# Patient Record
Sex: MALE | Race: WHITE | NOT HISPANIC OR LATINO | Employment: STUDENT | URBAN - METROPOLITAN AREA
[De-identification: names, ages, dates, MRNs, and addresses within clinical notes are randomized per-mention and may not be internally consistent; named-entity substitution may affect disease eponyms.]

---
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
## 2022-10-29 ENCOUNTER — HOSPITAL ENCOUNTER (EMERGENCY)
Facility: HOSPITAL | Age: 22
Discharge: HOME | End: 2022-10-29
Attending: SURGERY
Payer: MEDICAID

## 2022-10-29 VITALS
RESPIRATION RATE: 18 BRPM | SYSTOLIC BLOOD PRESSURE: 172 MMHG | OXYGEN SATURATION: 95 % | HEART RATE: 113 BPM | BODY MASS INDEX: 52.42 KG/M2 | TEMPERATURE: 101 F | DIASTOLIC BLOOD PRESSURE: 99 MMHG | WEIGHT: 315 LBS

## 2022-10-29 DIAGNOSIS — J10.1 INFLUENZA A: Primary | ICD-10-CM

## 2022-10-29 LAB
INFLUENZA A, MOLECULAR: POSITIVE
INFLUENZA B, MOLECULAR: NEGATIVE
SARS-COV-2 RDRP RESP QL NAA+PROBE: NEGATIVE
SPECIMEN SOURCE: ABNORMAL

## 2022-10-29 PROCEDURE — 25000003 PHARM REV CODE 250: Performed by: NURSE PRACTITIONER

## 2022-10-29 PROCEDURE — 99283 EMERGENCY DEPT VISIT LOW MDM: CPT | Mod: 25

## 2022-10-29 PROCEDURE — 87502 INFLUENZA DNA AMP PROBE: CPT

## 2022-10-29 PROCEDURE — U0002 COVID-19 LAB TEST NON-CDC: HCPCS | Performed by: NURSE PRACTITIONER

## 2022-10-29 PROCEDURE — 87502 INFLUENZA DNA AMP PROBE: CPT | Performed by: NURSE PRACTITIONER

## 2022-10-29 RX ORDER — OSELTAMIVIR PHOSPHATE 75 MG/1
75 CAPSULE ORAL 2 TIMES DAILY
Qty: 10 CAPSULE | Refills: 0 | Status: SHIPPED | OUTPATIENT
Start: 2022-10-29 | End: 2022-11-03

## 2022-10-29 RX ORDER — ACETAMINOPHEN 500 MG
1000 TABLET ORAL
Status: COMPLETED | OUTPATIENT
Start: 2022-10-29 | End: 2022-10-29

## 2022-10-29 RX ADMIN — ACETAMINOPHEN 1000 MG: 500 TABLET ORAL at 07:10

## 2022-10-29 NOTE — ED PROVIDER NOTES
Encounter Date: 10/29/2022       History     Chief Complaint   Patient presents with    Suture / Staple Removal     Patient to ER CC of needing staples removed from his head    General Illness     Also reports flu like symptoms, cough and fever started yesterday      Greg Wynn is a 22 y.o. male with PMH of ADHD who presents to the ED for staple removal and flu-like symptoms.  Patient presents with staples after he sustained an injury to his head on 10/16/22.  He was evaluated at [facility] ED. He denies pain or drainage from site.  He presents for staple removal.  He also notes nasal congestion, subjective fever, and cough.  Cough is dry nonproductive.  Denies chest pain or shortness of breath.  Denies sick contacts at home.    The history is provided by the patient.   Review of patient's allergies indicates:  No Known Allergies  Past Medical History:   Diagnosis Date    ADHD (attention deficit hyperactivity disorder)      Past Surgical History:   Procedure Laterality Date    CHOLECYSTECTOMY  06/20/2018    LAPAROSCOPIC CHOLECYSTECTOMY N/A 6/20/2018    Procedure: CHOLECYSTECTOMY, LAPAROSCOPIC;  Surgeon: Luis Bogran-Reyes, MD;  Location: [facility];  Service: General;  Laterality: N/A;     Family History   Problem Relation Age of Onset    Diabetes Mother     Hypertension Mother     Other Mother         fluid extractions from heart     Heart disease Mother         double bypass    Hypertension Father     Other Father         heart attack    Heart disease Father     No Known Problems Sister     No Known Problems Brother     Cancer Maternal Aunt     No Known Problems Maternal Uncle     Depression Paternal Aunt     No Known Problems Paternal Uncle     Allergies Maternal Grandmother     Diabetes Maternal Grandfather     Other Paternal Grandmother     Hypertension Paternal Grandfather     ADD / ADHD Neg Hx     Alcohol abuse Neg Hx     Asthma Neg Hx     Autism spectrum disorder Neg Hx     Behavior problems Neg Hx     Birth  defects Neg Hx     Chromosomal disorder Neg Hx     Cleft lip Neg Hx     Congenital heart disease Neg Hx     Early death Neg Hx     Eczema Neg Hx     Hearing loss Neg Hx     Hyperlipidemia Neg Hx     Kidney disease Neg Hx     Learning disabilities Neg Hx     Mental illness Neg Hx     Migraines Neg Hx     Neurodegenerative disease Neg Hx     Obesity Neg Hx     Seizures Neg Hx     SIDS Neg Hx     Thyroid disease Neg Hx      Social History     Tobacco Use    Smoking status: Never    Smokeless tobacco: Never   Substance Use Topics    Alcohol use: No    Drug use: No     Review of Systems   Constitutional:  Positive for fever. Negative for activity change and fatigue.   HENT:  Positive for congestion and rhinorrhea. Negative for sore throat.    Respiratory:  Positive for cough. Negative for chest tightness and shortness of breath.    Cardiovascular:  Negative for chest pain.   Gastrointestinal:  Negative for abdominal pain, diarrhea, nausea and vomiting.   Genitourinary:  Negative for dysuria.   Musculoskeletal:  Negative for back pain.   Skin:  Positive for wound.   Neurological:  Negative for dizziness and weakness.     Physical Exam     Initial Vitals [10/29/22 1856]   BP Pulse Resp Temp SpO2   (!) 172/99 (!) 113 18 (!) 100.5 °F (38.1 °C) 95 %      MAP       --         Physical Exam    Nursing note and vitals reviewed.  Constitutional: He appears well-developed and well-nourished.   HENT:   Head: Normocephalic and atraumatic.   Nose: Mucosal edema and rhinorrhea present.   Eyes: Conjunctivae and EOM are normal. Pupils are equal, round, and reactive to light.   Neck: Neck supple.   Cardiovascular:  Normal rate, regular rhythm, normal heart sounds and intact distal pulses.           Pulmonary/Chest: Breath sounds normal.   Abdominal: Abdomen is soft. Bowel sounds are normal.   Musculoskeletal:         General: Normal range of motion.      Cervical back: Neck supple.     Neurological: He is alert and oriented to person,  place, and time. He has normal strength.   Skin: Skin is warm and dry. Laceration (scalp staples intact. No drainage) noted.   Psychiatric: He has a normal mood and affect. His behavior is normal. Judgment and thought content normal.       ED Course   Suture Removal    Date/Time: 10/29/2022 6:59 PM  Location procedure was performed: [facility] EMERGENCY DEPARTMENT  Performed by: Zayra Lu NP  Authorized by: Lester Nuñez MD   Assisting provider: Lester Nuñez MD  Pre-operative diagnosis: staple removal  Post-operative diagnosis: same  Body area: head/neck  Location details: scalp  Description of findings: scalp laceration   Wound Appearance: clean, well healed, nontender and no drainage  Staples Removed: 5  Post-removal: no dressing applied      Labs Reviewed   INFLUENZA A & B BY MOLECULAR - Abnormal; Notable for the following components:       Result Value    Influenza A, Molecular Positive (*)     All other components within normal limits   SARS-COV-2 RNA AMPLIFICATION, QUAL          Imaging Results    None          Medications   acetaminophen tablet 1,000 mg (1,000 mg Oral Given 10/29/22 1901)     Medical Decision Making:   Differential Diagnosis:   Influenza, COVID-19, viral URI  Clinical Tests:   Lab Tests: Ordered and Reviewed  ED Management:  Evaluation of a 22-year-old male with nasal congestion, subjective fever, and cough.  He also presents for staple removal.  Scalp laceration with staples with no obvious signs of infection.  Staples removed without difficulty.  Tolerated well.  Influenza A positive.  Patient will be discharged home with prescription Tamiflu and symptomatic treatment. Patient/caregiver voices understanding and feels comfortable with discharge plan.      The patient acknowledges that close follow up with medical provider is required. Instructed to follow up with PCP within 2 days. Patient was given specific return precautions. The patient agrees to comply with all instruction and  directions given in the ER.                          Clinical Impression:   Final diagnoses:  [J10.1] Influenza A (Primary)      ED Disposition Condition    Discharge Stable          ED Prescriptions       Medication Sig Dispense Start Date End Date Auth. Provider    oseltamivir (TAMIFLU) 75 MG capsule Take 1 capsule (75 mg total) by mouth 2 (two) times daily. for 5 days 10 capsule 10/29/2022 11/3/2022 Zayra Lu NP          Follow-up Information       Follow up With Specialties Details Why Contact Info    Evans Combs MD Pediatrics Schedule an appointment as soon as possible for a visit in 2 days  [address] [facility] [address]  [phone]               Zayra Lu NP  10/29/22 1953

## 2022-10-30 NOTE — ED NOTES
MD reviewed results and DC instructions with patient, patient verbalized understanding. AVS printed and given. Refer to MD notes for patient assessment

## 2022-11-02 ENCOUNTER — PATIENT OUTREACH (OUTPATIENT)
Dept: EMERGENCY MEDICINE | Facility: HOSPITAL | Age: 22
End: 2022-11-02
Payer: MEDICAID

## 2022-11-08 NOTE — PROGRESS NOTES
ED Navigator attempted to contact patient on 3 or more separate occasions, patient is unable to reach. ED Navigator to close encounter at this time.     Ronda Zamorano  ED Navigator- [place]  [phone]

## 2024-04-18 ENCOUNTER — OFFICE VISIT (OUTPATIENT)
Dept: URGENT CARE | Facility: CLINIC | Age: 24
End: 2024-04-18
Payer: COMMERCIAL

## 2024-04-18 VITALS
SYSTOLIC BLOOD PRESSURE: 160 MMHG | BODY MASS INDEX: 45.1 KG/M2 | TEMPERATURE: 99 F | HEIGHT: 70 IN | WEIGHT: 315 LBS | HEART RATE: 79 BPM | DIASTOLIC BLOOD PRESSURE: 101 MMHG | OXYGEN SATURATION: 97 % | RESPIRATION RATE: 18 BRPM

## 2024-04-18 DIAGNOSIS — K52.9 GASTROENTERITIS: Primary | ICD-10-CM

## 2024-04-18 PROCEDURE — 99214 OFFICE O/P EST MOD 30 MIN: CPT | Mod: S$GLB,,, | Performed by: FAMILY MEDICINE

## 2024-04-18 RX ORDER — PROMETHAZINE HYDROCHLORIDE 25 MG/1
25 TABLET ORAL EVERY 6 HOURS PRN
Qty: 20 TABLET | Refills: 0 | Status: SHIPPED | OUTPATIENT
Start: 2024-04-18

## 2024-04-18 RX ORDER — DIPHENOXYLATE HYDROCHLORIDE AND ATROPINE SULFATE 2.5; .025 MG/1; MG/1
1 TABLET ORAL 4 TIMES DAILY PRN
Qty: 20 TABLET | Refills: 0 | Status: SHIPPED | OUTPATIENT
Start: 2024-04-18

## 2024-04-18 RX ORDER — PROMETHAZINE HYDROCHLORIDE 25 MG/1
25 SUPPOSITORY RECTAL EVERY 6 HOURS PRN
Qty: 5 SUPPOSITORY | Refills: 0 | Status: SHIPPED | OUTPATIENT
Start: 2024-04-18

## 2024-04-18 NOTE — LETTER
April 18, 2024  Greg Wynn  [address]  [place] [address]                [facility] [place]  [address]  [place] [address]  Phone: [phone]  Fax: [phone] Greg Wynn was seen and treated in our Urgent Care department on 4/18/2024. He may return to work in 2 - 3 days.      If you have any questions or concerns, please don't hesitate to call.        Sincerely,        Giuseppe Cruz MD

## 2024-04-18 NOTE — PROGRESS NOTES
"Subjective:      Patient ID: Greg Wynn is a 24 y.o. male.    Vitals:  height is 5' 10" (1.778 m) and weight is 160.6 kg (354 lb) (abnormal). His oral temperature is 98.5 °F (36.9 °C). His blood pressure is 160/101 (abnormal) and his pulse is 79. His respiration is 18 and oxygen saturation is 97%.     Chief Complaint: Emesis    Woke with vomiting and diarrhea this morning.     Emesis   This is a new problem. The current episode started today. The problem occurs 2 to 4 times per day. The problem has been unchanged. The emesis has an appearance of stomach contents. There has been no fever. Associated symptoms include diarrhea and headaches. Pertinent negatives include no abdominal pain, arthralgias, chest pain, chills, coughing, decreased urine volume, dizziness, fever, myalgias, sweats, URI or weight loss. Risk factors: denies. He has tried nothing for the symptoms. The treatment provided no relief.       Constitution: Negative. Negative for chills and fever.   HENT: Negative.     Cardiovascular: Negative.  Negative for chest pain.   Eyes: Negative.    Respiratory: Negative.  Negative for cough.    Gastrointestinal:  Positive for vomiting and diarrhea. Negative for abdominal pain.   Endocrine: negative.   Genitourinary: Negative.  Negative for urine decreased.   Musculoskeletal: Negative.  Negative for joint pain and muscle ache.   Skin: Negative.    Allergic/Immunologic: Negative.    Neurological:  Positive for headaches. Negative for dizziness.   Hematologic/Lymphatic: Negative.    Psychiatric/Behavioral: Negative.        Objective:     Physical Exam   Constitutional: He is oriented to person, place, and time. He appears well-developed.   HENT:   Head: Normocephalic and atraumatic.   Ears:   Right Ear: External ear normal.   Left Ear: External ear normal.   Nose: Nose normal.   Mouth/Throat: Mucous membranes are normal.   Eyes: Conjunctivae and lids are normal.   Neck: Trachea normal. Neck supple. "   Cardiovascular: Normal rate, regular rhythm and normal heart sounds.   Pulmonary/Chest: Effort normal and breath sounds normal. No respiratory distress.   Abdominal: Normal appearance and bowel sounds are normal. He exhibits no distension and no mass. Soft. There is no abdominal tenderness.   Musculoskeletal: Normal range of motion.         General: Normal range of motion.   Neurological: He is alert and oriented to person, place, and time. He has normal strength.   Skin: Skin is warm, dry, intact, not diaphoretic and not pale.   Psychiatric: His speech is normal and behavior is normal. Judgment and thought content normal.   Nursing note and vitals reviewed.      Assessment:     1. Gastroenteritis        Plan:       Gastroenteritis  -     promethazine (PHENERGAN) 25 MG suppository; Place 1 suppository (25 mg total) rectally every 6 (six) hours as needed for Nausea.  Dispense: 5 suppository; Refill: 0  -     promethazine (PHENERGAN) 25 MG tablet; Take 1 tablet (25 mg total) by mouth every 6 (six) hours as needed for Nausea.  Dispense: 20 tablet; Refill: 0  -     diphenoxylate-atropine 2.5-0.025 mg (LOMOTIL) 2.5-0.025 mg per tablet; Take 1 tablet by mouth 4 (four) times daily as needed for Diarrhea.  Dispense: 20 tablet; Refill: 0      Please drink plenty of fluids.  Please get plenty of rest.  Clear liquid diet as instructed for 2 - 3 days or until symptoms improve.  Please return here or go to the Emergency Department for any concerns or worsening of condition.  If you were prescribed antibiotics, please take them to completion.  If not allergic, please take over the counter Tylenol (Acetaminophen) as directed for control of pain and/or fever.  Motrin (advil) or Alleve may help a fever, but they may also worsen your Nausea and Vomiting.    Please follow up with your primary care doctor or specialist as needed.    Evans Combs MD ([id number])  [phone]    If you  smoke, please stop smoking.    You must  understand that you have received treatment at an Urgent Care facility only, and that you may be  released before all of your medical problems are known or treated. Urgent Care facilities are not equipped to  handle life threatening emergencies. It is recommended that you seek care at an Emergency Department for  further evaluation of worsening or concerning symptoms, or possibly life threatening conditions as  discussed.

## 2024-04-18 NOTE — PATIENT INSTRUCTIONS
Please drink plenty of fluids.  Please get plenty of rest.  Clear liquid diet as instructed for 2 - 3 days or until symptoms improve.  Please return here or go to the Emergency Department for any concerns or worsening of condition.  If you were prescribed antibiotics, please take them to completion.  If not allergic, please take over the counter Tylenol (Acetaminophen) as directed for control of pain and/or fever.  Motrin (advil) or Alleve may help a fever, but they may also worsen your Nausea and Vomiting.    Please follow up with your primary care doctor or specialist as needed.    Evans Combs MD (inactive) [phone]    If you  smoke, please stop smoking.    You must understand that you have received treatment at an Urgent Care facility only, and that you may be  released before all of your medical problems are known or treated. Urgent Care facilities are not equipped to  handle life threatening emergencies. It is recommended that you seek care at an Emergency Department for  further evaluation of worsening or concerning symptoms, or possibly life threatening conditions as  discussed.    Clear Liquid Diet    Clear liquids are any liquid that you can see through. They are also very easy to digest. You may be put on a clear liquid diet if you are recovering from irritation or infection of the stomach or intestinal tract. This diet may also be used before surgery or special procedures such as a colonoscopy. You should not be on this diet for more than 3 days. Below are some clear liquids you can have on this diet.  Adults and children over 2 years old  Adults should drink a total of 2 to 3 quarts of liquid per day. It may be easier to drink small frequent servings rather than a few large ones. Liquids can include:  Fruit juices. Strained orange juice or lemonade (no pulp); apple, grape, and cranberry juice; clear fruit drinks  Beverages. Sport drinks, sodas, mineral water (plain or flavored), tea, black  coffee, liquid gelatin (add twice the recommended amount of water)  Soups. Clear broth  Desserts. Plain gelatin, popsicles, fruit juice bars  Children under 2 years old  Oral rehydration fluids are available at drug stores and most grocery stores. You dont need a prescription.  Date Last Reviewed: 8/1/2016 © 2000-2016 [employer] [address] [place] [address]. All rights reserved. This information is not intended as a substitute for professional medical care. Always follow your healthcare professional's instructions.      Viral Gastroenteritis (Adult)    Gastroenteritis is commonly called the stomach flu. It is most often caused by a virus that affects the stomach and intestinal tract and usually lasts from 2 to 7 days. Common viruses causing gastroenteritis include norovirus, rotavirus, and hepatitis A. Non-viral causes of gastroenteritis include bacteria, parasites, and toxins.  The danger from repeated vomiting or diarrhea is dehydration. This is the loss of too much fluid from the body. When this occurs, body fluids must be replaced. Antibiotics do not help with this illness because it is usually viral.Simple home treatment will be helpful.  Symptoms of viral gastroenteritis may include:  Watery, loose stools  Stomach pain or abdominal cramps  Fever and chills  Nausea and vomiting  Loss of bowel control  Headache  Home care  Gastroenteritis is transmitted by contact with the stool or vomit of an infected person. This can occur from person to person or from contact with a contaminated surface.  Follow these guidelines when caring for yourself at home:  If symptoms are severe, rest at home for the next 24 hours or until you are feeling better.  Wash your hands with soap and water or use alcohol-based  to prevent the spread of infection. Wash your hands after touching anyone who is sick.  Wash your hands or use alcohol-based  after using the toilet and before meals.  Clean the toilet after each use.  Remember these tips when preparing food:  People with diarrhea should not prepare or serve food to others. When preparing foods, wash your hands before and after.  Wash your hands after using cutting boards, countertops, knives, or utensils that have been in contact with raw food.  Keep uncooked meats away from cooked and ready-to-eat foods.  Medicine  You may use acetaminophen or NSAID medicines like ibuprofen or naproxen to control fever unless another medicine was given. If you have chronic liver or kidney disease, talk with your healthcare provider before using these medicines. Also talk with your provider if you've had a stomach ulcer or gastrointestinal bleeding. Don't give aspirin to anyone under 18 years of age who is ill with a fever. It may cause severe liver damage. Don't use NSAIDS is you are already taking one for another condition (like arthritis) or are on aspirin (such as for heart disease or after a stroke).  If medicine for vomiting or diarrhea are prescribed, take these only as directed. Do not take over-the-counter medicines for vomiting or diarrhea unless instructed by your healthcare provider.  Diet  Follow these guidelines for food:  Water and liquids are important so you don't get dehydrated. Drink a small amount at a time or suck on ice chips if you are vomiting.  If you eat, avoid fatty, greasy, spicy, or fried foods.  Don't eat dairy if you have diarrhea. This can make diarrhea worse.  Avoid tobacco, alcohol, and caffeine which may worsen symptoms.  During the first 24 hours (the first full day), follow the diet below:  Beverages. Sports drinks, soft drinks without caffeine, ginger ale, mineral water (plain or flavored), decaffeinated tea and coffee. If you are very dehydrated, sports drinks aren't a good choice. They have too much sugar and not enough electrolytes. In this case, commercially available products called oral rehydration solutions, are  best.  Soups. Eat clear broth, consommé, and bouillon.  Desserts. Eat gelatin, popsicles, and fruit juice bars.  During the next 24 hours (the second day), you may add the following to the above:  Hot cereal, plain toast, bread, rolls, and crackers  Plain noodles, rice, mashed potatoes, chicken noodle or rice soup  Unsweetened canned fruit (avoid pineapple), bananas  Limit fat intake to less than 15 grams per day. Do this by avoiding margarine, butter, oils, mayonnaise, sauces, gravies, fried foods, peanut butter, meat, poultry, and fish.  Limit fiber and avoid raw or cooked vegetables, fresh fruits (except bananas), and bran cereals.  Limit caffeine and chocolate. Don't use spices or seasonings other than salt.  Limit dairy products.  Avoid alcohol.  During the next 24 hours:  Gradually resume a normal diet as you feel better and your symptoms improve.  If at any time it starts getting worse again, go back to clear liquids until you feel better.  Follow-up care  Follow up with your healthcare provider, or as advised. Call your provider if you don't get better within 24 hours or if diarrhea lasts more than a week. Also follow up if you are unable to keep down liquids and get dehydrated. If a stool (diarrhea) sample was taken, call as directed for the results.  Call 911  Call 911 if any of these occur:  Trouble breathing  Chest pain  Confused  Severe drowsiness or trouble awakening  Fainting or loss of consciousness  Rapid heart rate  Seizure  Stiff neck  When to seek medical advice  Call your healthcare provider right away if any of these occur:  Abdominal pain that gets worse  Continued vomiting (unable to keep liquids down)  Frequent diarrhea (more than 5 times a day)  Blood in vomit or stool (black or red color)  Dark urine, reduced urine output, or extreme thirst  Weakness or dizziness  Drowsiness  Fever of 100.4°F (38°C) oral or higher that does not get better with fever medicine  New rash  Date Last Reviewed:  1/3/2016  © 0295-8310 [employer] [address] [place] [address], [place] [address]. All rights reserved. This information is not intended as a substitute for professional medical care. Always follow your healthcare professional's instructions.

## 2024-07-01 ENCOUNTER — HOSPITAL ENCOUNTER (OUTPATIENT)
Facility: HOSPITAL | Age: 24
Discharge: HOME | End: 2024-07-03
Attending: EMERGENCY MEDICINE | Admitting: INTERNAL MEDICINE
Payer: COMMERCIAL

## 2024-07-01 DIAGNOSIS — E11.22 CKD STAGE 2 DUE TO TYPE 2 DIABETES MELLITUS: ICD-10-CM

## 2024-07-01 DIAGNOSIS — N18.2 CKD STAGE 2 DUE TO TYPE 2 DIABETES MELLITUS: ICD-10-CM

## 2024-07-01 DIAGNOSIS — K57.32 DIVERTICULITIS OF SIGMOID COLON: ICD-10-CM

## 2024-07-01 DIAGNOSIS — R73.9 HYPERGLYCEMIA: Primary | ICD-10-CM

## 2024-07-01 DIAGNOSIS — K57.92 DIVERTICULITIS: ICD-10-CM

## 2024-07-01 LAB
ALBUMIN SERPL BCP-MCNC: 3.7 G/DL (ref 3.5–5.2)
ALP SERPL-CCNC: 101 U/L (ref 55–135)
ALT SERPL W/O P-5'-P-CCNC: 57 U/L (ref 10–44)
ANION GAP SERPL CALC-SCNC: 8 MMOL/L (ref 8–16)
AST SERPL-CCNC: 28 U/L (ref 10–40)
BACTERIA #/AREA URNS HPF: NORMAL /HPF
BASOPHILS # BLD AUTO: 0.06 K/UL (ref 0–0.2)
BASOPHILS NFR BLD: 0.4 % (ref 0–1.9)
BILIRUB SERPL-MCNC: 0.8 MG/DL (ref 0.1–1)
BILIRUB UR QL STRIP: NEGATIVE
BUN SERPL-MCNC: 12 MG/DL (ref 6–20)
CALCIUM SERPL-MCNC: 9.4 MG/DL (ref 8.7–10.5)
CHLORIDE SERPL-SCNC: 103 MMOL/L (ref 95–110)
CLARITY UR: CLEAR
CO2 SERPL-SCNC: 27 MMOL/L (ref 23–29)
COLOR UR: YELLOW
CREAT SERPL-MCNC: 0.8 MG/DL (ref 0.5–1.4)
DIFFERENTIAL METHOD BLD: ABNORMAL
EOSINOPHIL # BLD AUTO: 0.2 K/UL (ref 0–0.5)
EOSINOPHIL NFR BLD: 1.2 % (ref 0–8)
ERYTHROCYTE [DISTWIDTH] IN BLOOD BY AUTOMATED COUNT: 12.9 % (ref 11.5–14.5)
EST. GFR  (NO RACE VARIABLE): >60 ML/MIN/1.73 M^2
GLUCOSE SERPL-MCNC: 202 MG/DL (ref 70–110)
GLUCOSE UR QL STRIP: ABNORMAL
HCT VFR BLD AUTO: 41.6 % (ref 40–54)
HGB BLD-MCNC: 13.8 G/DL (ref 14–18)
HGB UR QL STRIP: NEGATIVE
HYALINE CASTS #/AREA URNS LPF: 1 /LPF
IMM GRANULOCYTES # BLD AUTO: 0.05 K/UL (ref 0–0.04)
IMM GRANULOCYTES NFR BLD AUTO: 0.4 % (ref 0–0.5)
KETONES UR QL STRIP: ABNORMAL
LEUKOCYTE ESTERASE UR QL STRIP: NEGATIVE
LIPASE SERPL-CCNC: 9 U/L (ref 4–60)
LYMPHOCYTES # BLD AUTO: 1.5 K/UL (ref 1–4.8)
LYMPHOCYTES NFR BLD: 11.3 % (ref 18–48)
MCH RBC QN AUTO: 27.7 PG (ref 27–31)
MCHC RBC AUTO-ENTMCNC: 33.2 G/DL (ref 32–36)
MCV RBC AUTO: 84 FL (ref 82–98)
MICROSCOPIC COMMENT: NORMAL
MONOCYTES # BLD AUTO: 0.9 K/UL (ref 0.3–1)
MONOCYTES NFR BLD: 6.6 % (ref 4–15)
NEUTROPHILS # BLD AUTO: 10.9 K/UL (ref 1.8–7.7)
NEUTROPHILS NFR BLD: 80.1 % (ref 38–73)
NITRITE UR QL STRIP: NEGATIVE
NRBC BLD-RTO: 0 /100 WBC
PH UR STRIP: 7 [PH] (ref 5–8)
PLATELET # BLD AUTO: 311 K/UL (ref 150–450)
PMV BLD AUTO: 10.2 FL (ref 9.2–12.9)
POTASSIUM SERPL-SCNC: 3.9 MMOL/L (ref 3.5–5.1)
PROT SERPL-MCNC: 7.7 G/DL (ref 6–8.4)
PROT UR QL STRIP: ABNORMAL
RBC # BLD AUTO: 4.98 M/UL (ref 4.6–6.2)
RBC #/AREA URNS HPF: 1 /HPF (ref 0–4)
SODIUM SERPL-SCNC: 138 MMOL/L (ref 136–145)
SP GR UR STRIP: 1.01 (ref 1–1.03)
URN SPEC COLLECT METH UR: ABNORMAL
UROBILINOGEN UR STRIP-ACNC: 1 EU/DL
WBC # BLD AUTO: 13.56 K/UL (ref 3.9–12.7)
WBC #/AREA URNS HPF: 1 /HPF (ref 0–5)

## 2024-07-01 PROCEDURE — 85025 COMPLETE CBC W/AUTO DIFF WBC: CPT | Performed by: PHYSICIAN ASSISTANT

## 2024-07-01 PROCEDURE — 25500020 PHARM REV CODE 255: Performed by: EMERGENCY MEDICINE

## 2024-07-01 PROCEDURE — 63600175 PHARM REV CODE 636 W HCPCS: Performed by: EMERGENCY MEDICINE

## 2024-07-01 PROCEDURE — 83690 ASSAY OF LIPASE: CPT | Performed by: PHYSICIAN ASSISTANT

## 2024-07-01 PROCEDURE — 25000003 PHARM REV CODE 250: Performed by: EMERGENCY MEDICINE

## 2024-07-01 PROCEDURE — G0378 HOSPITAL OBSERVATION PER HR: HCPCS

## 2024-07-01 PROCEDURE — 81000 URINALYSIS NONAUTO W/SCOPE: CPT | Performed by: PHYSICIAN ASSISTANT

## 2024-07-01 PROCEDURE — 80053 COMPREHEN METABOLIC PANEL: CPT | Performed by: PHYSICIAN ASSISTANT

## 2024-07-01 PROCEDURE — 63600175 PHARM REV CODE 636 W HCPCS: Performed by: INTERNAL MEDICINE

## 2024-07-01 PROCEDURE — 94760 N-INVAS EAR/PLS OXIMETRY 1: CPT

## 2024-07-01 RX ORDER — SODIUM CHLORIDE, SODIUM LACTATE, POTASSIUM CHLORIDE, CALCIUM CHLORIDE 600; 310; 30; 20 MG/100ML; MG/100ML; MG/100ML; MG/100ML
1000 INJECTION, SOLUTION INTRAVENOUS CONTINUOUS
Status: DISCONTINUED | OUTPATIENT
Start: 2024-07-01 | End: 2024-07-02

## 2024-07-01 RX ORDER — ONDANSETRON HYDROCHLORIDE 2 MG/ML
8 INJECTION, SOLUTION INTRAVENOUS
Status: COMPLETED | OUTPATIENT
Start: 2024-07-01 | End: 2024-07-01

## 2024-07-01 RX ORDER — HYDROMORPHONE HYDROCHLORIDE 1 MG/ML
0.5 INJECTION, SOLUTION INTRAMUSCULAR; INTRAVENOUS; SUBCUTANEOUS EVERY 6 HOURS PRN
Status: DISCONTINUED | OUTPATIENT
Start: 2024-07-01 | End: 2024-07-03

## 2024-07-01 RX ORDER — TALC
6 POWDER (GRAM) TOPICAL NIGHTLY PRN
Status: DISCONTINUED | OUTPATIENT
Start: 2024-07-01 | End: 2024-07-03 | Stop reason: HOSPADM

## 2024-07-01 RX ORDER — SODIUM CHLORIDE 0.9 % (FLUSH) 0.9 %
10 SYRINGE (ML) INJECTION
Status: DISCONTINUED | OUTPATIENT
Start: 2024-07-01 | End: 2024-07-03 | Stop reason: HOSPADM

## 2024-07-01 RX ORDER — AMOXICILLIN AND CLAVULANATE POTASSIUM 875; 125 MG/1; MG/1
1 TABLET, FILM COATED ORAL
Status: DISCONTINUED | OUTPATIENT
Start: 2024-07-01 | End: 2024-07-01

## 2024-07-01 RX ORDER — MORPHINE SULFATE 2 MG/ML
4 INJECTION, SOLUTION INTRAMUSCULAR; INTRAVENOUS
Status: COMPLETED | OUTPATIENT
Start: 2024-07-01 | End: 2024-07-01

## 2024-07-01 RX ADMIN — PIPERACILLIN AND TAZOBACTAM 4.5 G: 4; .5 INJECTION, POWDER, LYOPHILIZED, FOR SOLUTION INTRAVENOUS; PARENTERAL at 04:07

## 2024-07-01 RX ADMIN — SODIUM CHLORIDE, POTASSIUM CHLORIDE, SODIUM LACTATE AND CALCIUM CHLORIDE 1000 ML: 600; 310; 30; 20 INJECTION, SOLUTION INTRAVENOUS at 05:07

## 2024-07-01 RX ADMIN — MORPHINE SULFATE 4 MG: 2 INJECTION, SOLUTION INTRAMUSCULAR; INTRAVENOUS at 02:07

## 2024-07-01 RX ADMIN — IOHEXOL 100 ML: 350 INJECTION, SOLUTION INTRAVENOUS at 03:07

## 2024-07-01 RX ADMIN — SODIUM CHLORIDE, POTASSIUM CHLORIDE, SODIUM LACTATE AND CALCIUM CHLORIDE 1000 ML: 600; 310; 30; 20 INJECTION, SOLUTION INTRAVENOUS at 04:07

## 2024-07-01 RX ADMIN — ONDANSETRON 8 MG: 2 INJECTION INTRAMUSCULAR; INTRAVENOUS at 02:07

## 2024-07-01 RX ADMIN — HYDROMORPHONE HYDROCHLORIDE 0.5 MG: 1 INJECTION, SOLUTION INTRAMUSCULAR; INTRAVENOUS; SUBCUTANEOUS at 06:07

## 2024-07-01 NOTE — PHARMACY MED REC
"    [facility]           Pharmacy  Admission Medication History     The home medication history was taken by Corrina Kerns.      Medication history obtained from Medications listed below were obtained from: Patient/family.Patient states he is not taking any medications    Based on information gathered for medication list, you may go to "Admission" then "Reconcile Home Medications" tabs to review and/or act upon those items.     The home medication list has been updated by the Pharmacy department.   Please read ALL comments highlighted in yellow.   Please address this information as you see fit.    Feel free to contact us if you have any questions or require assistance.    The medications listed below were removed from the home medication list.  Please reorder if appropriate:    Patient reports NOT TAKING the following medication(s):  Cetirizine 10mg  Flonase       No current facility-administered medications on file prior to encounter.     Current Outpatient Medications on File Prior to Encounter   Medication Sig Dispense Refill       Please address this information as you see fit.  Feel free to contact us if you have any questions or require assistance.    Corrina Kerns  [phone]              .          "

## 2024-07-01 NOTE — LETTER
July 3, 2024    Gerg Wynn                   [address] [address]  Phone: [phone]  Fax: [phone]   July 3, 2024     Patient: Greg Wynn       Date of Visit: 7/1/2024       To Whom it May Concern:    Greg Wynn was seen in the hospital on 7/1/2024. He may return to work on 7/4/2024 .    Please excuse him from work missed.    If you have any questions or concerns, please don't hesitate to call.    Sincerely,         FABIOLA Gomez

## 2024-07-01 NOTE — ED PROVIDER NOTES
Encounter Date: 7/1/2024       History     Chief Complaint   Patient presents with    Abdominal Pain     HPI    Patient is a 24y Wm hx ADHD, fatty liver presenting with left lower quadrant pain for the past 3 days.  Pain is rated 9/10, sharp, dull, aching and stabbing. Pain has been worsening and walking and moving makes it worse.  No nausea. Vomited yellow bile this morning.   Endorses associated left testicle soreness  Review of patient's allergies indicates:  No Known Allergies  Past Medical History:   Diagnosis Date    ADHD (attention deficit hyperactivity disorder)      Past Surgical History:   Procedure Laterality Date    CHOLECYSTECTOMY  06/20/2018    LAPAROSCOPIC CHOLECYSTECTOMY N/A 6/20/2018    Procedure: CHOLECYSTECTOMY, LAPAROSCOPIC;  Surgeon: Luis Bogran-Reyes, MD;  Location: [facility];  Service: General;  Laterality: N/A;     Family History   Problem Relation Name Age of Onset    Diabetes Mother      Hypertension Mother      Other Mother          fluid extractions from heart     Heart disease Mother          double bypass    Hypertension Father      Other Father          heart attack    Heart disease Father      No Known Problems Sister      No Known Problems Brother      Cancer Maternal Aunt      No Known Problems Maternal Uncle      Depression Paternal Aunt      No Known Problems Paternal Uncle      Allergies Maternal Grandmother      Diabetes Maternal Grandfather      Other Paternal Grandmother      Hypertension Paternal Grandfather      ADD / ADHD Neg Hx      Alcohol abuse Neg Hx      Asthma Neg Hx      Autism spectrum disorder Neg Hx      Behavior problems Neg Hx      Birth defects Neg Hx      Chromosomal disorder Neg Hx      Cleft lip Neg Hx      Congenital heart disease Neg Hx      Early death Neg Hx      Eczema Neg Hx      Hearing loss Neg Hx      Hyperlipidemia Neg Hx      Kidney disease Neg Hx      Learning disabilities Neg Hx      Mental illness Neg Hx      Migraines Neg Hx       Neurodegenerative disease Neg Hx      Obesity Neg Hx      Seizures Neg Hx      SIDS Neg Hx      Thyroid disease Neg Hx       Social History     Tobacco Use    Smoking status: Never     Passive exposure: Past    Smokeless tobacco: Never   Substance Use Topics    Alcohol use: No    Drug use: No     Review of Systems   Constitutional:  Negative for chills and fever.   Cardiovascular:  Negative for chest pain.   Gastrointestinal:  Positive for abdominal pain and vomiting.   Musculoskeletal:  Negative for back pain.   All other systems reviewed and are negative.       Social Determinants of Health     Tobacco Use: Low Risk  (7/1/2024)    Patient History     Smoking Tobacco Use: Never     Smokeless Tobacco Use: Never     Passive Exposure: Past   Alcohol Use: Not on file   Financial Resource Strain: Not on file   Food Insecurity: Not on file   Transportation Needs: Not on file   Physical Activity: Not on file   Stress: Not on file   Housing Stability: Not on file   Depression: Not on file   Utilities: Not on file   Health Literacy: Not on file   Social Isolation: Not on file       Physical Exam     Initial Vitals [07/01/24 1358]   BP Pulse Resp Temp SpO2   (!) 184/98 98 18 98.6 °F (37 °C) 98 %      MAP       --         Physical Exam    Nursing note and vitals reviewed.  Constitutional: He appears well-developed and well-nourished.   HENT:   Head: Normocephalic.   Eyes: EOM are normal. Pupils are equal, round, and reactive to light.   Neck:   Normal range of motion.  Cardiovascular:  Normal rate and intact distal pulses.           Pulmonary/Chest: Breath sounds normal.   Abdominal: Abdomen is soft. There is abdominal tenderness in the left upper quadrant and left lower quadrant.   Genitourinary:    Testes normal.   Cremasteric reflex is present.    Genitourinary Comments: Chaperone present     Musculoskeletal:         General: Normal range of motion.      Cervical back: Normal range of motion.     Neurological: He is alert  and oriented to person, place, and time.   Skin: Capillary refill takes less than 2 seconds.         ED Course   Procedures  Labs Reviewed   CBC W/ AUTO DIFFERENTIAL - Abnormal; Notable for the following components:       Result Value    WBC 13.56 (*)     Hemoglobin 13.8 (*)     Gran # (ANC) 10.9 (*)     Immature Grans (Abs) 0.05 (*)     Gran % 80.1 (*)     Lymph % 11.3 (*)     All other components within normal limits   COMPREHENSIVE METABOLIC PANEL - Abnormal; Notable for the following components:    Glucose 202 (*)     ALT 57 (*)     All other components within normal limits   URINALYSIS, REFLEX TO URINE CULTURE - Abnormal; Notable for the following components:    Protein, UA 1+ (*)     Glucose, UA 2+ (*)     Ketones, UA 1+ (*)     All other components within normal limits    Narrative:     Specimen Source->Urine   LIPASE   URINALYSIS MICROSCOPIC    Narrative:     Specimen Source->Urine          Imaging Results               CT Abdomen Pelvis With IV Contrast NO Oral Contrast (Final result)  Result time 07/01/24 15:50:41      Final result by Evans Hooper MD (07/01/24 15:50:41)                   Impression:      1. Hepatic steatosis  2. Mild splenomegaly.  3. Diverticulosis with findings suspicious for acute diverticulitis of the proximal sigmoid colon with a punctate focus of adjacent extraluminal free intraperitoneal air.  Differential diagnosis includes a segment of inflammatory colitis.  Correlate clinically with possible fever and/or elevated white count.  4. No CT evidence to suggest acute appendicitis.  This report was flagged in Epic as abnormal.      Electronically signed by: Evans Hooper  Date:    07/01/2024  Time:    15:50               Narrative:    EXAMINATION:  CT ABDOMEN PELVIS WITH IV CONTRAST    CLINICAL HISTORY:  Abdominal pain, acute, nonlocalized;r/o hernia;    TECHNIQUE:  Low dose axial images, sagittal and coronal reformations were obtained from the lung bases to the pubic symphysis  following the IV administration of 100 mL of Omnipaque 350 .  Oral contrast was not given.    COMPARISON:  None.    FINDINGS:  Liver is normal in size demonstrating homogeneous decreased attenuation consistent with fatty infiltration.  Previous cholecystectomy.  The spleen is mildly enlarged.  The pancreas and adrenal glands are unremarkable.    Kidneys are normal in size and enhance homogeneously.  No renal calculi.  No changes of hydronephrosis.  No perinephric inflammatory change.    Air and stool throughout the colon and rectum.  Scattered diverticula within the distal colon.  Within the proximal sigmoid colon in the left lower quadrant there is a 7 cm segment of asymmetric circumferential bowel wall thickening with extensive surrounding mesenteric inflammatory change suspicious for acute diverticulitis.  There is an adjacent punctate focus of free intraperitoneal air likely secondary to a ruptured diverticulum.  No localized fluid collection.  No resulting bowel obstruction.  The appendix is normal in caliber.    Bladder is partially distended.  Prostate, seminal vesicles and rectum unremarkable.    No significant mesenteric or retroperitoneal lymphadenopathy.                                       Medications   sodium chloride 0.9% flush 10 mL (has no administration in time range)   melatonin tablet 6 mg (has no administration in time range)   lactated ringers infusion (1,000 mLs Intravenous New Bag 7/1/24 1755)   piperacillin-tazobactam (ZOSYN) 4.5 g in D5W 100 mL IVPB (MB+) (0 g Intravenous Stopped 7/2/24 0034)   HYDROmorphone injection 0.5 mg (0.5 mg Intravenous Given 7/2/24 0412)   morphine injection 4 mg (4 mg Intravenous Given 7/1/24 1428)   ondansetron injection 8 mg (8 mg Intravenous Given 7/1/24 1427)   iohexoL (OMNIPAQUE 350) injection 100 mL (100 mLs Intravenous Given 7/1/24 1543)   piperacillin-tazobactam (ZOSYN) 4.5 g in D5W 100 mL IVPB (MB+) (0 g Intravenous Stopped 7/1/24 1659)   lactated ringers  bolus 1,000 mL (0 mLs Intravenous Stopped 7/1/24 9900)     Medical Decision Making    This is an emergent evaluation of a 24y WM hx fatty liver, hyperglycemia presenting with LLQ abdominal pain.  Exam he is non toxic, afebrile with tenderness in the LLQ and normal  exam.  Labs remarkable for elevated AST, hyperglycemia and leukocytosis.  CT shows diverticulitis with perforation, no abscess.  Will admit for zosyn and surgery evaluation.  He has been made NPO.      DDX: diverticulitis, diverticulosis, UTI, pyelonephritis, nephrolithiasis    Amount and/or Complexity of Data Reviewed  Labs: ordered. Decision-making details documented in ED Course.  Radiology: ordered.    Risk  OTC drugs.  Prescription drug management.               ED Course as of 07/02/24 0609 Mon Jul 01, 2024   1602 WBC(!): 13.56 [AP]   1602 Glucose(!): 202 [AP]      ED Course User Index  [AP] Lindsay Deng MD                           Clinical Impression:  Final diagnoses:  [K57.92] Diverticulitis  [R73.9] Hyperglycemia (Primary)          ED Disposition Condition    Observation Stable                Lindsay Deng MD  07/02/24 0609

## 2024-07-01 NOTE — ED TRIAGE NOTES
C/o lower abdominal pain radiating into testicles since Saturday and getting worse. Vomited this morning.

## 2024-07-01 NOTE — PLAN OF CARE
Problem: Adult Inpatient Plan of Care  Goal: Plan of Care Review  Outcome: Progressing  Goal: Patient-Specific Goal (Individualized)  Outcome: Progressing  Goal: Absence of Hospital-Acquired Illness or Injury  Outcome: Progressing  Goal: Optimal Comfort and Wellbeing  Outcome: Progressing  Goal: Readiness for Transition of Care  Outcome: Progressing     Problem: Bariatric Environmental Safety  Goal: Safety Maintained with Care  Outcome: Progressing     Problem: Pain Acute  Goal: Optimal Pain Control and Function  Outcome: Progressing     Problem: Infection  Goal: Absence of Infection Signs and Symptoms  Outcome: Progressing     Problem: Fall Injury Risk  Goal: Absence of Fall and Fall-Related Injury  Outcome: Progressing

## 2024-07-01 NOTE — LETTER
July 3, 2024          No Recipients                [address] [facility] [address]  Phone: [phone]  Fax: [phone]   Patient: Greg Wynn   MR Number: [id number]   YOB: 2000   Date of Visit: 7/1/2024       Dear Dr. Warren Recipients:    Thank you for referring Greg Wynn to me for evaluation. Below are the relevant portions of my assessment and plan of care.            If you have questions, please do not hesitate to call me. I look forward to following Greg along with you.    Sincerely,      Patricia De La Garza RN           CC    No Recipients

## 2024-07-02 PROBLEM — D72.829 LEUKOCYTOSIS: Status: ACTIVE | Noted: 2024-07-02

## 2024-07-02 PROBLEM — K57.32 DIVERTICULITIS OF SIGMOID COLON: Status: ACTIVE | Noted: 2024-07-02

## 2024-07-02 PROBLEM — E66.01 SEVERE OBESITY (BMI >= 40): Status: ACTIVE | Noted: 2024-07-02

## 2024-07-02 LAB
ALBUMIN SERPL BCP-MCNC: 3.2 G/DL (ref 3.5–5.2)
ALP SERPL-CCNC: 90 U/L (ref 55–135)
ALT SERPL W/O P-5'-P-CCNC: 68 U/L (ref 10–44)
ANION GAP SERPL CALC-SCNC: 8 MMOL/L (ref 8–16)
AST SERPL-CCNC: 48 U/L (ref 10–40)
BASOPHILS # BLD AUTO: 0.06 K/UL (ref 0–0.2)
BASOPHILS NFR BLD: 0.4 % (ref 0–1.9)
BILIRUB SERPL-MCNC: 1.5 MG/DL (ref 0.1–1)
BUN SERPL-MCNC: 9 MG/DL (ref 6–20)
CALCIUM SERPL-MCNC: 9.1 MG/DL (ref 8.7–10.5)
CHLORIDE SERPL-SCNC: 103 MMOL/L (ref 95–110)
CO2 SERPL-SCNC: 27 MMOL/L (ref 23–29)
CREAT SERPL-MCNC: 1.1 MG/DL (ref 0.5–1.4)
DIFFERENTIAL METHOD BLD: ABNORMAL
EOSINOPHIL # BLD AUTO: 0.1 K/UL (ref 0–0.5)
EOSINOPHIL NFR BLD: 0.6 % (ref 0–8)
ERYTHROCYTE [DISTWIDTH] IN BLOOD BY AUTOMATED COUNT: 12.9 % (ref 11.5–14.5)
EST. GFR  (NO RACE VARIABLE): >60 ML/MIN/1.73 M^2
ESTIMATED AVG GLUCOSE: 220 MG/DL (ref 68–131)
GLUCOSE SERPL-MCNC: 224 MG/DL (ref 70–110)
HBA1C MFR BLD: 9.3 % (ref 4–5.6)
HCT VFR BLD AUTO: 40.3 % (ref 40–54)
HGB BLD-MCNC: 12.9 G/DL (ref 14–18)
IMM GRANULOCYTES # BLD AUTO: 0.09 K/UL (ref 0–0.04)
IMM GRANULOCYTES NFR BLD AUTO: 0.6 % (ref 0–0.5)
LYMPHOCYTES # BLD AUTO: 1.7 K/UL (ref 1–4.8)
LYMPHOCYTES NFR BLD: 11.6 % (ref 18–48)
MCH RBC QN AUTO: 27.3 PG (ref 27–31)
MCHC RBC AUTO-ENTMCNC: 32 G/DL (ref 32–36)
MCV RBC AUTO: 85 FL (ref 82–98)
MONOCYTES # BLD AUTO: 1.3 K/UL (ref 0.3–1)
MONOCYTES NFR BLD: 8.6 % (ref 4–15)
NEUTROPHILS # BLD AUTO: 11.4 K/UL (ref 1.8–7.7)
NEUTROPHILS NFR BLD: 78.2 % (ref 38–73)
NRBC BLD-RTO: 0 /100 WBC
PLATELET # BLD AUTO: 306 K/UL (ref 150–450)
PMV BLD AUTO: 10.2 FL (ref 9.2–12.9)
POCT GLUCOSE: 165 MG/DL (ref 70–110)
POCT GLUCOSE: 201 MG/DL (ref 70–110)
POTASSIUM SERPL-SCNC: 4.1 MMOL/L (ref 3.5–5.1)
PROT SERPL-MCNC: 7.1 G/DL (ref 6–8.4)
RBC # BLD AUTO: 4.73 M/UL (ref 4.6–6.2)
SODIUM SERPL-SCNC: 138 MMOL/L (ref 136–145)
WBC # BLD AUTO: 14.6 K/UL (ref 3.9–12.7)

## 2024-07-02 PROCEDURE — 83036 HEMOGLOBIN GLYCOSYLATED A1C: CPT | Performed by: INTERNAL MEDICINE

## 2024-07-02 PROCEDURE — 85025 COMPLETE CBC W/AUTO DIFF WBC: CPT | Performed by: EMERGENCY MEDICINE

## 2024-07-02 PROCEDURE — 63600175 PHARM REV CODE 636 W HCPCS: Performed by: INTERNAL MEDICINE

## 2024-07-02 PROCEDURE — 25000003 PHARM REV CODE 250: Performed by: EMERGENCY MEDICINE

## 2024-07-02 PROCEDURE — 99222 1ST HOSP IP/OBS MODERATE 55: CPT | Mod: ,,, | Performed by: PHYSICIAN ASSISTANT

## 2024-07-02 PROCEDURE — 25000003 PHARM REV CODE 250: Performed by: PHYSICIAN ASSISTANT

## 2024-07-02 PROCEDURE — 63600175 PHARM REV CODE 636 W HCPCS: Performed by: EMERGENCY MEDICINE

## 2024-07-02 PROCEDURE — G0378 HOSPITAL OBSERVATION PER HR: HCPCS

## 2024-07-02 PROCEDURE — 96372 THER/PROPH/DIAG INJ SC/IM: CPT | Performed by: PHYSICIAN ASSISTANT

## 2024-07-02 PROCEDURE — 80053 COMPREHEN METABOLIC PANEL: CPT | Performed by: EMERGENCY MEDICINE

## 2024-07-02 PROCEDURE — 36415 COLL VENOUS BLD VENIPUNCTURE: CPT | Performed by: EMERGENCY MEDICINE

## 2024-07-02 PROCEDURE — 63600175 PHARM REV CODE 636 W HCPCS: Performed by: PHYSICIAN ASSISTANT

## 2024-07-02 PROCEDURE — 94761 N-INVAS EAR/PLS OXIMETRY MLT: CPT

## 2024-07-02 RX ORDER — INSULIN ASPART 100 [IU]/ML
0-5 INJECTION, SOLUTION INTRAVENOUS; SUBCUTANEOUS EVERY 6 HOURS PRN
Status: DISCONTINUED | OUTPATIENT
Start: 2024-07-02 | End: 2024-07-03 | Stop reason: HOSPADM

## 2024-07-02 RX ORDER — SODIUM CHLORIDE 9 MG/ML
INJECTION, SOLUTION INTRAVENOUS CONTINUOUS
Status: DISCONTINUED | OUTPATIENT
Start: 2024-07-02 | End: 2024-07-03 | Stop reason: HOSPADM

## 2024-07-02 RX ORDER — GLUCAGON 1 MG
1 KIT INJECTION
Status: DISCONTINUED | OUTPATIENT
Start: 2024-07-02 | End: 2024-07-03 | Stop reason: HOSPADM

## 2024-07-02 RX ADMIN — PIPERACILLIN AND TAZOBACTAM 4.5 G: 4; .5 INJECTION, POWDER, LYOPHILIZED, FOR SOLUTION INTRAVENOUS; PARENTERAL at 07:07

## 2024-07-02 RX ADMIN — HYDROMORPHONE HYDROCHLORIDE 0.5 MG: 1 INJECTION, SOLUTION INTRAMUSCULAR; INTRAVENOUS; SUBCUTANEOUS at 04:07

## 2024-07-02 RX ADMIN — PIPERACILLIN AND TAZOBACTAM 4.5 G: 4; .5 INJECTION, POWDER, LYOPHILIZED, FOR SOLUTION INTRAVENOUS; PARENTERAL at 12:07

## 2024-07-02 RX ADMIN — SODIUM CHLORIDE: 9 INJECTION, SOLUTION INTRAVENOUS at 05:07

## 2024-07-02 RX ADMIN — INSULIN ASPART 2 UNITS: 100 INJECTION, SOLUTION INTRAVENOUS; SUBCUTANEOUS at 06:07

## 2024-07-02 RX ADMIN — HYDROMORPHONE HYDROCHLORIDE 0.5 MG: 1 INJECTION, SOLUTION INTRAMUSCULAR; INTRAVENOUS; SUBCUTANEOUS at 10:07

## 2024-07-02 RX ADMIN — PIPERACILLIN AND TAZOBACTAM 4.5 G: 4; .5 INJECTION, POWDER, LYOPHILIZED, FOR SOLUTION INTRAVENOUS; PARENTERAL at 04:07

## 2024-07-02 RX ADMIN — HYDROMORPHONE HYDROCHLORIDE 0.5 MG: 1 INJECTION, SOLUTION INTRAMUSCULAR; INTRAVENOUS; SUBCUTANEOUS at 11:07

## 2024-07-02 RX ADMIN — SODIUM CHLORIDE: 9 INJECTION, SOLUTION INTRAVENOUS at 10:07

## 2024-07-02 RX ADMIN — PIPERACILLIN AND TAZOBACTAM 4.5 G: 4; .5 INJECTION, POWDER, LYOPHILIZED, FOR SOLUTION INTRAVENOUS; PARENTERAL at 11:07

## 2024-07-02 NOTE — ASSESSMENT & PLAN NOTE
LLQ pain  Leukocytosis   CTAP with diverticulitis of the proximal sigmoid colon.  IV flagyl/IV cipro  IV fluids/NPO

## 2024-07-02 NOTE — PROGRESS NOTES
[facility] Surg (3rd Fl)  General Surgery  Consult Note    Consults  Subjective:     Chief Complaint/Reason for Admission: Diverticulitis    History of Present Illness: 24 year old obese male who presented to the emergency department due to worsening left lower quadrant abdominal pain. Patient says he had some cramping and constipation over the weekend but then develop severe left lower quadrant abdominal pain this morning. Due to this pain presented to the emergency room for evaluation. patient had CT scan which showed diverticulitis with micro perforation. I was contacted for evaluation. Patient says hes never had symptoms like this before. He reports history of normal bowel movements. Hes never had a colonoscopy. Patient does report some nausea and subjective fever. He reports episode of vomiting this morning. He said since admission he does feel better but still with six out of 10 left-sided pain. Patient denies dysuruia and hematuria. He does report tools tools earlier today. No Bloody stool.     No current facility-administered medications on file prior to encounter.     Current Outpatient Medications on File Prior to Encounter   Medication Sig    diphenoxylate-atropine 2.5-0.025 mg (LOMOTIL) 2.5-0.025 mg per tablet Take 1 tablet by mouth 4 (four) times daily as needed for Diarrhea. (Patient not taking: Reported on 7/1/2024)    promethazine (PHENERGAN) 25 MG suppository Place 1 suppository (25 mg total) rectally every 6 (six) hours as needed for Nausea. (Patient not taking: Reported on 7/1/2024)    promethazine (PHENERGAN) 25 MG tablet Take 1 tablet (25 mg total) by mouth every 6 (six) hours as needed for Nausea. (Patient not taking: Reported on 7/1/2024)    [DISCONTINUED] cetirizine (ALLERGY RELIEF, CETIRIZINE,) 10 MG tablet Take 1 tablet (10 mg total) by mouth once daily.    [DISCONTINUED] fluticasone propionate (FLONASE) 50 mcg/actuation nasal spray 1 spray (50 mcg total) by Each Nostril route  2 (two) times daily as needed. (Patient not taking: Reported on 4/18/2024)       Review of patient's allergies indicates:  No Known Allergies    Past Medical History:   Diagnosis Date    ADHD (attention deficit hyperactivity disorder)      Past Surgical History:   Procedure Laterality Date    CHOLECYSTECTOMY  06/20/2018    LAPAROSCOPIC CHOLECYSTECTOMY N/A 6/20/2018    Procedure: CHOLECYSTECTOMY, LAPAROSCOPIC;  Surgeon: Luis Bogran-Reyes, MD;  Location: [facility];  Service: General;  Laterality: N/A;     Family History     Problem Relation (Age of Onset)    Allergies Maternal Grandmother    Cancer Maternal Aunt    Depression Paternal Aunt    Diabetes Mother, Maternal Grandfather    Heart disease Mother, Father    Hypertension Mother, Father, Paternal Grandfather    No Known Problems Sister, Brother, Maternal Uncle, Paternal Uncle    Other Mother, Father, Paternal Grandmother        Tobacco Use    Smoking status: Never     Passive exposure: Past    Smokeless tobacco: Never   Substance and Sexual Activity    Alcohol use: No    Drug use: No    Sexual activity: Never     Review of Systems  Objective:     Vital Signs (Most Recent):  Temp: 100 °F (37.8 °C) (07/01/24 1929)  Pulse: 97 (07/01/24 2000)  Resp: 18 (07/01/24 1929)  BP: 138/74 (07/01/24 1929)  SpO2: (!) 94 % (07/01/24 1944)   Vital Signs (24h Range):  Temp:  [98.3 °F (36.8 °C)-100 °F (37.8 °C)] 100 °F (37.8 °C)  Pulse:  [] 97  Resp:  [16-19] 18  SpO2:  [94 %-98 %] 94 %  BP: (138-184)/(66-98) 138/74       Weight: (!) 161.6 kg (356 lb 4.2 oz)  Body mass index is 51.12 kg/m².      Intake/Output Summary (Last 24 hours) at 7/1/2024 2041  Last data filed at 7/1/2024 1756  Gross per 24 hour   Intake 0 ml   Output --   Net 0 ml       Physical Exam    Significant Labs:  CBC: Recent Labs   Lab 07/01/24  1423   WBC 13.56*   RBC 4.98   HGB 13.8*   HCT 41.6      MCV 84   MCH 27.7   MCHC 33.2     Significant Diagnostics:  CT: I have reviewed all pertinent  results/findings within the past 24 hours. diverticulitis with Micro perforation. No abscess or fluid collection    Assessment/Plan:     There are no hospital problems to display for this patient.    Thank you for your consult. patient with diverticulitis. Tenderness remains localized. No fluid collection or abscess. Agree with broad-spectrum antibiotics. No indication for urgent or  emergency surgery. Treatment plan discussed in detail with patient.    Hunter Vargas MD  General Surgery  [place] - Med Surg (3rd Fl)

## 2024-07-02 NOTE — H&P
Merged with [facility] ([address] [place] [address] Medicine  History & Physical    Patient Name: Greg Wynn  MRN: [id number]  Patient Class: OP- Observation  Admission Date: 7/1/2024  Attending Physician: Laura Marroquin MD   Primary Care Provider: Evans Combs MD (Inactive)         Patient information was obtained from patient and ER records.     Subjective:     Principal Problem:Diverticulitis of sigmoid colon    Chief Complaint:   Chief Complaint   Patient presents with    Abdominal Pain        HPI: Patient is a 24 year old male with medical history of obesity and fatty liver disease who presented to the ED with LLQ abdomina pain.  Symptoms started Saturday and describes it as stabbing pain.  Initially he was 9/10 pain and now 8/10 pain.   He has had constipation, nausea and vomiting.      Admitted for acute diverticulitis.        Past Medical History:   Diagnosis Date    ADHD (attention deficit hyperactivity disorder)        Past Surgical History:   Procedure Laterality Date    CHOLECYSTECTOMY  06/20/2018    LAPAROSCOPIC CHOLECYSTECTOMY N/A 6/20/2018    Procedure: CHOLECYSTECTOMY, LAPAROSCOPIC;  Surgeon: Luis Bogran-Reyes, MD;  Location: [facility];  Service: General;  Laterality: N/A;       Review of patient's allergies indicates:  No Known Allergies    No current facility-administered medications on file prior to encounter.     Current Outpatient Medications on File Prior to Encounter   Medication Sig    [DISCONTINUED] diphenoxylate-atropine 2.5-0.025 mg (LOMOTIL) 2.5-0.025 mg per tablet Take 1 tablet by mouth 4 (four) times daily as needed for Diarrhea. (Patient not taking: Reported on 7/1/2024)    [DISCONTINUED] promethazine (PHENERGAN) 25 MG suppository Place 1 suppository (25 mg total) rectally every 6 (six) hours as needed for Nausea. (Patient not taking: Reported on 7/1/2024)    [DISCONTINUED] promethazine (PHENERGAN) 25 MG tablet Take 1 tablet (25 mg total) by mouth every 6 (six) hours as needed for Nausea.  (Patient not taking: Reported on 7/1/2024)     Family History       Problem Relation (Age of Onset)    Allergies Maternal Grandmother    Cancer Maternal Aunt    Depression Paternal Aunt    Diabetes Mother, Maternal Grandfather    Heart disease Mother, Father    Hypertension Mother, Father, Paternal Grandfather    No Known Problems Sister, Brother, Maternal Uncle, Paternal Uncle    Other Mother, Father, Paternal Grandmother          Tobacco Use    Smoking status: Never     Passive exposure: Past    Smokeless tobacco: Never   Substance and Sexual Activity    Alcohol use: No    Drug use: No    Sexual activity: Never     Review of Systems   Constitutional:  Negative for chills and fever.   HENT:  Negative for ear discharge and ear pain.    Eyes:  Negative for pain and discharge.   Respiratory:  Negative for cough and shortness of breath.    Cardiovascular:  Negative for chest pain and leg swelling.   Gastrointestinal:  Positive for abdominal pain, constipation and vomiting. Negative for abdominal distention and nausea.   Endocrine: Negative for cold intolerance and heat intolerance.   Genitourinary:  Negative for difficulty urinating and dysuria.   Musculoskeletal:  Negative for joint swelling and myalgias.   Skin:  Negative for rash and wound.   Neurological:  Negative for dizziness and headaches.   Psychiatric/Behavioral:  Negative for agitation and confusion.      Objective:     Vital Signs (Most Recent):  Temp: 99.5 °F (37.5 °C) (07/02/24 0739)  Pulse: 92 (07/02/24 0800)  Resp: 18 (07/02/24 0739)  BP: 135/65 (07/02/24 0739)  SpO2: 95 % (07/02/24 0739) Vital Signs (24h Range):  Temp:  [98 °F (36.7 °C)-100 °F (37.8 °C)] 99.5 °F (37.5 °C)  Pulse:  [] 92  Resp:  [16-20] 18  SpO2:  [94 %-98 %] 95 %  BP: (135-184)/(65-98) 135/65     Weight: (!) 161.6 kg (356 lb 4.2 oz)  Body mass index is 51.12 kg/m².     Physical Exam  Constitutional:       General: He is not in acute distress.  HENT:      Head: Normocephalic and  "atraumatic.   Eyes:      General:         Right eye: No discharge.         Left eye: No discharge.   Cardiovascular:      Rate and Rhythm: Normal rate and regular rhythm.   Pulmonary:      Effort: Pulmonary effort is normal.      Breath sounds: Normal breath sounds.   Abdominal:      General: Bowel sounds are normal. There is no distension.      Tenderness: There is no abdominal tenderness.      Comments: Mild LLQ tenderness    Musculoskeletal:         General: No swelling or tenderness.      Cervical back: Neck supple. No tenderness.   Skin:     General: Skin is warm and dry.   Neurological:      General: No focal deficit present.      Mental Status: He is alert and oriented to person, place, and time.   Psychiatric:         Mood and Affect: Mood normal.         Behavior: Behavior normal.                Significant Labs: A1C: No results for input(s): "HGBA1C" in the last 4320 hours.  ABGs: No results for input(s): "PH", "PCO2", "HCO3", "POCSATURATED", "BE", "TOTALHB", "COHB", "METHB", "O2HB", "POCFIO2", "PO2" in the last 48 hours.  Bilirubin:   Recent Labs   Lab 07/01/24  1423 07/02/24  0404   BILITOT 0.8 1.5*     Blood Culture: No results for input(s): "LABBLOO" in the last 48 hours.  BMP:   Recent Labs   Lab 07/02/24  0404   *      K 4.1      CO2 27   BUN 9   CREATININE 1.1   CALCIUM 9.1     CBC:   Recent Labs   Lab 07/01/24  1423 07/02/24  0404   WBC 13.56* 14.60*   HGB 13.8* 12.9*   HCT 41.6 40.3    306     CMP:   Recent Labs   Lab 07/01/24  1423 07/02/24  0404    138   K 3.9 4.1    103   CO2 27 27   * 224*   BUN 12 9   CREATININE 0.8 1.1   CALCIUM 9.4 9.1   PROT 7.7 7.1   ALBUMIN 3.7 3.2*   BILITOT 0.8 1.5*   ALKPHOS 101 90   AST 28 48*   ALT 57* 68*   ANIONGAP 8 8     Cardiac Markers: No results for input(s): "CKMB", "MYOGLOBIN", "BNP", "TROPISTAT" in the last 48 hours.  Coagulation: No results for input(s): "PT", "INR", "APTT" in the last 48 hours.  Lactic Acid: " "No results for input(s): "LACTATE" in the last 48 hours.  Lipase:   Recent Labs   Lab 07/01/24  1423   LIPASE 9     Lipid Panel: No results for input(s): "CHOL", "HDL", "LDLCALC", "TRIG", "CHOLHDL" in the last 48 hours.  Magnesium: No results for input(s): "MG" in the last 48 hours.  POCT Glucose: No results for input(s): "POCTGLUCOSE" in the last 48 hours.  Prealbumin: No results for input(s): "PREALBUMIN" in the last 48 hours.  Respiratory Culture: No results for input(s): "GSRESP", "RESPIRATORYC" in the last 48 hours.  Troponin: No results for input(s): "TROPONINI", "TROPONINIHS" in the last 48 hours.  TSH: No results for input(s): "TSH" in the last 4320 hours.  Urine Culture: No results for input(s): "LABURIN" in the last 48 hours.  Urine Studies:   Recent Labs   Lab 07/01/24  1610   COLORU Yellow   APPEARANCEUA Clear   PHUR 7.0   SPECGRAV 1.010   PROTEINUA 1+*   GLUCUA 2+*   KETONESU 1+*   BILIRUBINUA Negative   OCCULTUA Negative   NITRITE Negative   UROBILINOGEN 1.0   LEUKOCYTESUR Negative   RBCUA 1   WBCUA 1   BACTERIA Rare   HYALINECASTS 1       Significant Imaging: I have reviewed all pertinent imaging results/findings within the past 24 hours.  Assessment/Plan:     * Diverticulitis of sigmoid colon  LLQ pain  Leukocytosis   CTAP with diverticulitis of the proximal sigmoid colon.  IV flagyl/IV cipro  IV fluids/NPO         Severe obesity (BMI >= 40)  Body mass index is 51.12 kg/m². Morbid obesity complicates all aspects of disease management from diagnostic modalities to treatment.         Leukocytosis  Leukocytosis stable  Will monitor       Hyperglycemia   Glucose greater 200's   Hg A1C pending  Prediabetic in the past     VTE Risk Mitigation (From admission, onward)           Ordered     IP VTE HIGH RISK PATIENT  Once         07/01/24 1748     Place sequential compression device  Until discontinued         07/01/24 1748                         On 07/02/2024, patient should be placed in hospital " observation services under my care in collaboration with Dr. Garibay.          Ashley Nava PA-C  Department of Hospital Medicine  [place] [facility] Surg (3rd Fl)

## 2024-07-02 NOTE — HPI
Patient is a 24 year old male with medical history of obesity and fatty liver disease who presented to the ED with LLQ abdomina pain.  Symptoms started Saturday and describes it as stabbing pain.  Initially he was 9/10 pain and now 8/10 pain.   He has had constipation, nausea and vomiting.      Admitted for acute diverticulitis.

## 2024-07-02 NOTE — PLAN OF CARE
Problem: Adult Inpatient Plan of Care  Goal: Plan of Care Review  Outcome: Progressing  Goal: Readiness for Transition of Care  Outcome: Progressing     Problem: Pain Acute  Goal: Optimal Pain Control and Function  Outcome: Progressing     Problem: Infection  Goal: Absence of Infection Signs and Symptoms  Outcome: Progressing     Problem: Fall Injury Risk  Goal: Absence of Fall and Fall-Related Injury  Outcome: Progressing

## 2024-07-02 NOTE — NURSING
Patient is requesting referral to nutritionists if he cannot meet with one here in the hospital.   79

## 2024-07-02 NOTE — ASSESSMENT & PLAN NOTE
Body mass index is 51.12 kg/m². Morbid obesity complicates all aspects of disease management from diagnostic modalities to treatment.

## 2024-07-02 NOTE — PLAN OF CARE
07/02/24 0957   Rounds   Attendance Provider;Nurse ;   Discharge Plan A Home   Why the patient remains in the hospital Requires continued medical care   Transition of Care Barriers None     Care team at bedside, discussed plan of care with patient / family.  Discharge planning initiated. Patient provided with Case Management contact information and encouraged to call with concerns or questions. Discharge Planning Begins on Admission Pamphlet provided.  Will continue to follow for duration of stay.

## 2024-07-02 NOTE — SUBJECTIVE & OBJECTIVE
Past Medical History:   Diagnosis Date    ADHD (attention deficit hyperactivity disorder)        Past Surgical History:   Procedure Laterality Date    CHOLECYSTECTOMY  06/20/2018    LAPAROSCOPIC CHOLECYSTECTOMY N/A 6/20/2018    Procedure: CHOLECYSTECTOMY, LAPAROSCOPIC;  Surgeon: Luis Bogran-Reyes, MD;  Location: [facility];  Service: General;  Laterality: N/A;       Review of patient's allergies indicates:  No Known Allergies    No current facility-administered medications on file prior to encounter.     Current Outpatient Medications on File Prior to Encounter   Medication Sig    [DISCONTINUED] diphenoxylate-atropine 2.5-0.025 mg (LOMOTIL) 2.5-0.025 mg per tablet Take 1 tablet by mouth 4 (four) times daily as needed for Diarrhea. (Patient not taking: Reported on 7/1/2024)    [DISCONTINUED] promethazine (PHENERGAN) 25 MG suppository Place 1 suppository (25 mg total) rectally every 6 (six) hours as needed for Nausea. (Patient not taking: Reported on 7/1/2024)    [DISCONTINUED] promethazine (PHENERGAN) 25 MG tablet Take 1 tablet (25 mg total) by mouth every 6 (six) hours as needed for Nausea. (Patient not taking: Reported on 7/1/2024)     Family History       Problem Relation (Age of Onset)    Allergies Maternal Grandmother    Cancer Maternal Aunt    Depression Paternal Aunt    Diabetes Mother, Maternal Grandfather    Heart disease Mother, Father    Hypertension Mother, Father, Paternal Grandfather    No Known Problems Sister, Brother, Maternal Uncle, Paternal Uncle    Other Mother, Father, Paternal Grandmother          Tobacco Use    Smoking status: Never     Passive exposure: Past    Smokeless tobacco: Never   Substance and Sexual Activity    Alcohol use: No    Drug use: No    Sexual activity: Never     Review of Systems   Constitutional:  Negative for chills and fever.   HENT:  Negative for ear discharge and ear pain.    Eyes:  Negative for pain and discharge.   Respiratory:  Negative for cough and shortness of  breath.    Cardiovascular:  Negative for chest pain and leg swelling.   Gastrointestinal:  Positive for abdominal pain, constipation and vomiting. Negative for abdominal distention and nausea.   Endocrine: Negative for cold intolerance and heat intolerance.   Genitourinary:  Negative for difficulty urinating and dysuria.   Musculoskeletal:  Negative for joint swelling and myalgias.   Skin:  Negative for rash and wound.   Neurological:  Negative for dizziness and headaches.   Psychiatric/Behavioral:  Negative for agitation and confusion.      Objective:     Vital Signs (Most Recent):  Temp: 99.5 °F (37.5 °C) (07/02/24 0739)  Pulse: 92 (07/02/24 0800)  Resp: 18 (07/02/24 0739)  BP: 135/65 (07/02/24 0739)  SpO2: 95 % (07/02/24 0739) Vital Signs (24h Range):  Temp:  [98 °F (36.7 °C)-100 °F (37.8 °C)] 99.5 °F (37.5 °C)  Pulse:  [] 92  Resp:  [16-20] 18  SpO2:  [94 %-98 %] 95 %  BP: (135-184)/(65-98) 135/65     Weight: (!) 161.6 kg (356 lb 4.2 oz)  Body mass index is 51.12 kg/m².     Physical Exam  Constitutional:       General: He is not in acute distress.  HENT:      Head: Normocephalic and atraumatic.   Eyes:      General:         Right eye: No discharge.         Left eye: No discharge.   Cardiovascular:      Rate and Rhythm: Normal rate and regular rhythm.   Pulmonary:      Effort: Pulmonary effort is normal.      Breath sounds: Normal breath sounds.   Abdominal:      General: Bowel sounds are normal. There is no distension.      Tenderness: There is no abdominal tenderness.      Comments: Mild LLQ tenderness    Musculoskeletal:         General: No swelling or tenderness.      Cervical back: Neck supple. No tenderness.   Skin:     General: Skin is warm and dry.   Neurological:      General: No focal deficit present.      Mental Status: He is alert and oriented to person, place, and time.   Psychiatric:         Mood and Affect: Mood normal.         Behavior: Behavior normal.                Significant Labs: A1C:  "No results for input(s): "HGBA1C" in the last 4320 hours.  ABGs: No results for input(s): "PH", "PCO2", "HCO3", "POCSATURATED", "BE", "TOTALHB", "COHB", "METHB", "O2HB", "POCFIO2", "PO2" in the last 48 hours.  Bilirubin:   Recent Labs   Lab 07/01/24 1423 07/02/24  0404   BILITOT 0.8 1.5*     Blood Culture: No results for input(s): "LABBLOO" in the last 48 hours.  BMP:   Recent Labs   Lab 07/02/24  0404   *      K 4.1      CO2 27   BUN 9   CREATININE 1.1   CALCIUM 9.1     CBC:   Recent Labs   Lab 07/01/24 1423 07/02/24  0404   WBC 13.56* 14.60*   HGB 13.8* 12.9*   HCT 41.6 40.3    306     CMP:   Recent Labs   Lab 07/01/24 1423 07/02/24  0404    138   K 3.9 4.1    103   CO2 27 27   * 224*   BUN 12 9   CREATININE 0.8 1.1   CALCIUM 9.4 9.1   PROT 7.7 7.1   ALBUMIN 3.7 3.2*   BILITOT 0.8 1.5*   ALKPHOS 101 90   AST 28 48*   ALT 57* 68*   ANIONGAP 8 8     Cardiac Markers: No results for input(s): "CKMB", "MYOGLOBIN", "BNP", "TROPISTAT" in the last 48 hours.  Coagulation: No results for input(s): "PT", "INR", "APTT" in the last 48 hours.  Lactic Acid: No results for input(s): "LACTATE" in the last 48 hours.  Lipase:   Recent Labs   Lab 07/01/24  1423   LIPASE 9     Lipid Panel: No results for input(s): "CHOL", "HDL", "LDLCALC", "TRIG", "CHOLHDL" in the last 48 hours.  Magnesium: No results for input(s): "MG" in the last 48 hours.  POCT Glucose: No results for input(s): "POCTGLUCOSE" in the last 48 hours.  Prealbumin: No results for input(s): "PREALBUMIN" in the last 48 hours.  Respiratory Culture: No results for input(s): "GSRESP", "RESPIRATORYC" in the last 48 hours.  Troponin: No results for input(s): "TROPONINI", "TROPONINIHS" in the last 48 hours.  TSH: No results for input(s): "TSH" in the last 4320 hours.  Urine Culture: No results for input(s): "LABURIN" in the last 48 hours.  Urine Studies:   Recent Labs   Lab 07/01/24  1610   COLORU Yellow   APPEARANCEUA Clear   PHUR " 7.0   SPECGRAV 1.010   PROTEINUA 1+*   GLUCUA 2+*   KETONESU 1+*   BILIRUBINUA Negative   OCCULTUA Negative   NITRITE Negative   UROBILINOGEN 1.0   LEUKOCYTESUR Negative   RBCUA 1   WBCUA 1   BACTERIA Rare   HYALINECASTS 1       Significant Imaging: I have reviewed all pertinent imaging results/findings within the past 24 hours.

## 2024-07-02 NOTE — PLAN OF CARE
[place] [facility] S[facility] (3rd Fl)  Discharge Assessment    Primary Care Provider: Evans Combs MD (Inactive)     Discharge Assessment (most recent)       BRIEF DISCHARGE ASSESSMENT - 07/02/24 1221          Discharge Planning    Assessment Type Discharge Planning Brief Assessment (P)      Resource/Environmental Concerns none (P)      Support Systems Spouse/significant other;Parent;Family members;Friends/neighbors (P)      Assistance Needed none (P)      Equipment Currently Used at Home none (P)      Current Living Arrangements home (P)      Patient/Family Anticipates Transition to home (P)      Patient/Family Anticipated Services at Transition none (P)      DME Needed Upon Discharge  none (P)      Discharge Plan A Home (P)      Discharge Plan B Home with family (P)                      Discharge assessment completed at bedside with patient. No post acute needs determined at this time. SW to remain available if needs arise.

## 2024-07-03 VITALS
HEIGHT: 70 IN | OXYGEN SATURATION: 95 % | RESPIRATION RATE: 18 BRPM | WEIGHT: 315 LBS | DIASTOLIC BLOOD PRESSURE: 72 MMHG | TEMPERATURE: 99 F | SYSTOLIC BLOOD PRESSURE: 139 MMHG | HEART RATE: 92 BPM | BODY MASS INDEX: 45.1 KG/M2

## 2024-07-03 PROBLEM — E11.22 CKD STAGE 2 DUE TO TYPE 2 DIABETES MELLITUS: Status: ACTIVE | Noted: 2024-07-03

## 2024-07-03 PROBLEM — N18.2 CKD STAGE 2 DUE TO TYPE 2 DIABETES MELLITUS: Status: ACTIVE | Noted: 2024-07-03

## 2024-07-03 LAB
ALBUMIN SERPL BCP-MCNC: 2.7 G/DL (ref 3.5–5.2)
ALP SERPL-CCNC: 81 U/L (ref 55–135)
ALT SERPL W/O P-5'-P-CCNC: 56 U/L (ref 10–44)
ANION GAP SERPL CALC-SCNC: 9 MMOL/L (ref 8–16)
AST SERPL-CCNC: 32 U/L (ref 10–40)
BASOPHILS # BLD AUTO: 0.08 K/UL (ref 0–0.2)
BASOPHILS NFR BLD: 0.7 % (ref 0–1.9)
BILIRUB SERPL-MCNC: 0.9 MG/DL (ref 0.1–1)
BUN SERPL-MCNC: 8 MG/DL (ref 6–20)
CALCIUM SERPL-MCNC: 9.2 MG/DL (ref 8.7–10.5)
CHLORIDE SERPL-SCNC: 107 MMOL/L (ref 95–110)
CO2 SERPL-SCNC: 25 MMOL/L (ref 23–29)
CREAT SERPL-MCNC: 0.8 MG/DL (ref 0.5–1.4)
DIFFERENTIAL METHOD BLD: ABNORMAL
EOSINOPHIL # BLD AUTO: 0.2 K/UL (ref 0–0.5)
EOSINOPHIL NFR BLD: 1.8 % (ref 0–8)
ERYTHROCYTE [DISTWIDTH] IN BLOOD BY AUTOMATED COUNT: 13.2 % (ref 11.5–14.5)
EST. GFR  (NO RACE VARIABLE): >60 ML/MIN/1.73 M^2
GLUCOSE SERPL-MCNC: 170 MG/DL (ref 70–110)
HCT VFR BLD AUTO: 36.7 % (ref 40–54)
HGB BLD-MCNC: 11.8 G/DL (ref 14–18)
IMM GRANULOCYTES # BLD AUTO: 0.08 K/UL (ref 0–0.04)
IMM GRANULOCYTES NFR BLD AUTO: 0.7 % (ref 0–0.5)
LYMPHOCYTES # BLD AUTO: 1.6 K/UL (ref 1–4.8)
LYMPHOCYTES NFR BLD: 13.5 % (ref 18–48)
MAGNESIUM SERPL-MCNC: 1.8 MG/DL (ref 1.6–2.6)
MCH RBC QN AUTO: 27.7 PG (ref 27–31)
MCHC RBC AUTO-ENTMCNC: 32.2 G/DL (ref 32–36)
MCV RBC AUTO: 86 FL (ref 82–98)
MONOCYTES # BLD AUTO: 1 K/UL (ref 0.3–1)
MONOCYTES NFR BLD: 8.2 % (ref 4–15)
NEUTROPHILS # BLD AUTO: 8.8 K/UL (ref 1.8–7.7)
NEUTROPHILS NFR BLD: 75.1 % (ref 38–73)
NRBC BLD-RTO: 0 /100 WBC
PLATELET # BLD AUTO: 273 K/UL (ref 150–450)
PMV BLD AUTO: 9.9 FL (ref 9.2–12.9)
POCT GLUCOSE: 170 MG/DL (ref 70–110)
POCT GLUCOSE: 185 MG/DL (ref 70–110)
POCT GLUCOSE: 236 MG/DL (ref 70–110)
POTASSIUM SERPL-SCNC: 3.7 MMOL/L (ref 3.5–5.1)
PROT SERPL-MCNC: 6.8 G/DL (ref 6–8.4)
RBC # BLD AUTO: 4.26 M/UL (ref 4.6–6.2)
SODIUM SERPL-SCNC: 141 MMOL/L (ref 136–145)
WBC # BLD AUTO: 11.66 K/UL (ref 3.9–12.7)

## 2024-07-03 PROCEDURE — 83735 ASSAY OF MAGNESIUM: CPT | Performed by: PHYSICIAN ASSISTANT

## 2024-07-03 PROCEDURE — 63600175 PHARM REV CODE 636 W HCPCS: Performed by: EMERGENCY MEDICINE

## 2024-07-03 PROCEDURE — 85025 COMPLETE CBC W/AUTO DIFF WBC: CPT | Performed by: PHYSICIAN ASSISTANT

## 2024-07-03 PROCEDURE — G0378 HOSPITAL OBSERVATION PER HR: HCPCS

## 2024-07-03 PROCEDURE — 36415 COLL VENOUS BLD VENIPUNCTURE: CPT | Performed by: PHYSICIAN ASSISTANT

## 2024-07-03 PROCEDURE — 25000003 PHARM REV CODE 250: Performed by: PHYSICIAN ASSISTANT

## 2024-07-03 PROCEDURE — 99239 HOSP IP/OBS DSCHRG MGMT >30: CPT | Mod: ,,, | Performed by: PHYSICIAN ASSISTANT

## 2024-07-03 PROCEDURE — 25000003 PHARM REV CODE 250: Performed by: EMERGENCY MEDICINE

## 2024-07-03 PROCEDURE — 80053 COMPREHEN METABOLIC PANEL: CPT | Performed by: PHYSICIAN ASSISTANT

## 2024-07-03 PROCEDURE — 94760 N-INVAS EAR/PLS OXIMETRY 1: CPT

## 2024-07-03 PROCEDURE — 99900035 HC TECH TIME PER 15 MIN (STAT)

## 2024-07-03 RX ORDER — HYDROCODONE BITARTRATE AND ACETAMINOPHEN 7.5; 325 MG/1; MG/1
1 TABLET ORAL EVERY 4 HOURS PRN
Qty: 31 TABLET | Refills: 0 | Status: SHIPPED | OUTPATIENT
Start: 2024-07-03

## 2024-07-03 RX ORDER — CIPROFLOXACIN 500 MG/1
500 TABLET ORAL EVERY 12 HOURS
Qty: 14 TABLET | Refills: 0 | Status: SHIPPED | OUTPATIENT
Start: 2024-07-03 | End: 2024-07-10

## 2024-07-03 RX ORDER — METFORMIN HYDROCHLORIDE 500 MG/1
500 TABLET ORAL 2 TIMES DAILY WITH MEALS
Qty: 49 TABLET | Refills: 0 | Status: SHIPPED | OUTPATIENT
Start: 2024-07-03 | End: 2025-07-03

## 2024-07-03 RX ORDER — HYDROCODONE BITARTRATE AND ACETAMINOPHEN 7.5; 325 MG/1; MG/1
1 TABLET ORAL EVERY 6 HOURS PRN
Status: DISCONTINUED | OUTPATIENT
Start: 2024-07-03 | End: 2024-07-03 | Stop reason: HOSPADM

## 2024-07-03 RX ORDER — METRONIDAZOLE 500 MG/1
500 TABLET ORAL EVERY 8 HOURS
Qty: 21 TABLET | Refills: 0 | Status: SHIPPED | OUTPATIENT
Start: 2024-07-03 | End: 2024-07-10

## 2024-07-03 RX ADMIN — INSULIN ASPART 2 UNITS: 100 INJECTION, SOLUTION INTRAVENOUS; SUBCUTANEOUS at 12:07

## 2024-07-03 RX ADMIN — SODIUM CHLORIDE: 9 INJECTION, SOLUTION INTRAVENOUS at 09:07

## 2024-07-03 RX ADMIN — PIPERACILLIN AND TAZOBACTAM 4.5 G: 4; .5 INJECTION, POWDER, LYOPHILIZED, FOR SOLUTION INTRAVENOUS; PARENTERAL at 04:07

## 2024-07-03 RX ADMIN — PIPERACILLIN AND TAZOBACTAM 4.5 G: 4; .5 INJECTION, POWDER, LYOPHILIZED, FOR SOLUTION INTRAVENOUS; PARENTERAL at 08:07

## 2024-07-03 NOTE — ASSESSMENT & PLAN NOTE
LLQ pain  Leukocytosis   CTAP with diverticulitis of the proximal sigmoid colon.  IV flagyl/IV cipro  IV fluids/NPO     Abdominal pain improving.  Will attempt clear liquid diet.

## 2024-07-03 NOTE — ASSESSMENT & PLAN NOTE
Body mass index is 51.12 kg/m². Morbid obesity complicates all aspects of disease management from diagnostic modalities to treatment. Dietary and lifestyle modifications.

## 2024-07-03 NOTE — PLAN OF CARE
Problem: Adult Inpatient Plan of Care  Goal: Plan of Care Review  Outcome: Met     Problem: Bariatric Environmental Safety  Goal: Safety Maintained with Care  Outcome: Progressing     Problem: Pain Acute  Goal: Optimal Pain Control and Function  Outcome: Progressing     Problem: Infection  Goal: Absence of Infection Signs and Symptoms  Outcome: Progressing     Problem: Fall Injury Risk  Goal: Absence of Fall and Fall-Related Injury  Outcome: Progressing     Problem: Diabetes Comorbidity  Goal: Blood Glucose Level Within Targeted Range  Outcome: Progressing

## 2024-07-03 NOTE — SUBJECTIVE & OBJECTIVE
Past Medical History:   Diagnosis Date    ADHD (attention deficit hyperactivity disorder)        Past Surgical History:   Procedure Laterality Date    CHOLECYSTECTOMY  06/20/2018    LAPAROSCOPIC CHOLECYSTECTOMY N/A 6/20/2018    Procedure: CHOLECYSTECTOMY, LAPAROSCOPIC;  Surgeon: Luis Bogran-Reyes, MD;  Location: [facility];  Service: General;  Laterality: N/A;       Review of patient's allergies indicates:  No Known Allergies    No current facility-administered medications on file prior to encounter.     No current outpatient medications on file prior to encounter.     Family History       Problem Relation (Age of Onset)    Allergies Maternal Grandmother    Cancer Maternal Aunt    Depression Paternal Aunt    Diabetes Mother, Maternal Grandfather    Heart disease Mother, Father    Hypertension Mother, Father, Paternal Grandfather    No Known Problems Sister, Brother, Maternal Uncle, Paternal Uncle    Other Mother, Father, Paternal Grandmother          Tobacco Use    Smoking status: Never     Passive exposure: Past    Smokeless tobacco: Never   Substance and Sexual Activity    Alcohol use: No    Drug use: No    Sexual activity: Never     Review of Systems   Constitutional:  Negative for chills and fever.   HENT:  Negative for ear discharge and ear pain.    Eyes:  Negative for pain and discharge.   Respiratory:  Negative for cough and shortness of breath.    Cardiovascular:  Negative for chest pain and leg swelling.   Gastrointestinal:  Positive for abdominal pain (improving). Negative for abdominal distention, constipation, nausea and vomiting.   Endocrine: Negative for cold intolerance and heat intolerance.   Genitourinary:  Negative for difficulty urinating and dysuria.   Musculoskeletal:  Negative for joint swelling and myalgias.   Skin:  Negative for rash and wound.   Neurological:  Negative for dizziness and headaches.   Psychiatric/Behavioral:  Negative for agitation and confusion.      Objective:     Vital Signs  "(Most Recent):  Temp: 97.7 °F (36.5 °C) (07/03/24 0757)  Pulse: 89 (07/03/24 1001)  Resp: 18 (07/03/24 0757)  BP: 137/74 (07/03/24 0757)  SpO2: (!) 94 % (07/03/24 0757) Vital Signs (24h Range):  Temp:  [97.7 °F (36.5 °C)-99.5 °F (37.5 °C)] 97.7 °F (36.5 °C)  Pulse:  [85-99] 89  Resp:  [18-20] 18  SpO2:  [92 %-97 %] 94 %  BP: (114-143)/(56-82) 137/74     Weight: (!) 161.6 kg (356 lb 4.2 oz)  Body mass index is 51.12 kg/m².     Physical Exam  Constitutional:       General: He is not in acute distress.  HENT:      Head: Normocephalic and atraumatic.   Eyes:      General:         Right eye: No discharge.         Left eye: No discharge.   Cardiovascular:      Rate and Rhythm: Normal rate and regular rhythm.   Pulmonary:      Effort: Pulmonary effort is normal.      Breath sounds: Normal breath sounds.   Abdominal:      General: Bowel sounds are normal. There is no distension.      Tenderness: There is no abdominal tenderness.   Musculoskeletal:         General: No swelling or tenderness.      Cervical back: Neck supple. No tenderness.   Skin:     General: Skin is warm and dry.   Neurological:      General: No focal deficit present.      Mental Status: He is alert and oriented to person, place, and time.   Psychiatric:         Mood and Affect: Mood normal.         Behavior: Behavior normal.                Significant Labs: A1C:   Recent Labs   Lab 07/02/24  0404   HGBA1C 9.3*     ABGs: No results for input(s): "PH", "PCO2", "HCO3", "POCSATURATED", "BE", "TOTALHB", "COHB", "METHB", "O2HB", "POCFIO2", "PO2" in the last 48 hours.  Bilirubin:   Recent Labs   Lab 07/01/24  1423 07/02/24  0404 07/03/24  0600   BILITOT 0.8 1.5* 0.9     Blood Culture: No results for input(s): "LABBLOO" in the last 48 hours.  BMP:   Recent Labs   Lab 07/03/24  0600   *      K 3.7      CO2 25   BUN 8   CREATININE 0.8   CALCIUM 9.2   MG 1.8     CBC:   Recent Labs   Lab 07/01/24  1423 07/02/24  0404 07/03/24  0600   WBC 13.56* " "14.60* 11.66   HGB 13.8* 12.9* 11.8*   HCT 41.6 40.3 36.7*    306 273     CMP:   Recent Labs   Lab 07/01/24  1423 07/02/24  0404 07/03/24  0600    138 141   K 3.9 4.1 3.7    103 107   CO2 27 27 25   * 224* 170*   BUN 12 9 8   CREATININE 0.8 1.1 0.8   CALCIUM 9.4 9.1 9.2   PROT 7.7 7.1 6.8   ALBUMIN 3.7 3.2* 2.7*   BILITOT 0.8 1.5* 0.9   ALKPHOS 101 90 81   AST 28 48* 32   ALT 57* 68* 56*   ANIONGAP 8 8 9     Cardiac Markers: No results for input(s): "CKMB", "MYOGLOBIN", "BNP", "TROPISTAT" in the last 48 hours.  Coagulation: No results for input(s): "PT", "INR", "APTT" in the last 48 hours.  Lactic Acid: No results for input(s): "LACTATE" in the last 48 hours.  Lipase:   Recent Labs   Lab 07/01/24  1423   LIPASE 9     Lipid Panel: No results for input(s): "CHOL", "HDL", "LDLCALC", "TRIG", "CHOLHDL" in the last 48 hours.  Magnesium:   Recent Labs   Lab 07/03/24  0600   MG 1.8     POCT Glucose:   Recent Labs   Lab 07/02/24  1820 07/02/24  2319 07/03/24  0507   POCTGLUCOSE 201* 165* 185*     Prealbumin: No results for input(s): "PREALBUMIN" in the last 48 hours.  Respiratory Culture: No results for input(s): "GSRESP", "RESPIRATORYC" in the last 48 hours.  Troponin: No results for input(s): "TROPONINI", "TROPONINIHS" in the last 48 hours.  TSH: No results for input(s): "TSH" in the last 4320 hours.  Urine Culture: No results for input(s): "LABURIN" in the last 48 hours.  Urine Studies:   Recent Labs   Lab 07/01/24  1610   COLORU Yellow   APPEARANCEUA Clear   PHUR 7.0   SPECGRAV 1.010   PROTEINUA 1+*   GLUCUA 2+*   KETONESU 1+*   BILIRUBINUA Negative   OCCULTUA Negative   NITRITE Negative   UROBILINOGEN 1.0   LEUKOCYTESUR Negative   RBCUA 1   WBCUA 1   BACTERIA Rare   HYALINECASTS 1       Significant Imaging: I have reviewed all pertinent imaging results/findings within the past 24 hours.  "

## 2024-07-03 NOTE — ASSESSMENT & PLAN NOTE
Creatine stable for now. BMP reviewed- noted Estimated Creatinine Clearance: 222.1 mL/min (based on SCr of 0.8 mg/dL). according to latest data. Based on current GFR, CKD stage is stage 2 - GFR 60-89.  Monitor UOP and serial BMP and adjust therapy as needed. Renally dose meds. Avoid nephrotoxic medications and procedures.    D/c with metformin, jardiance &lifestyle/dietary modifications

## 2024-07-03 NOTE — HOSPITAL COURSE
PT HD stable on room air.  Patient abdominal tenderness improving.  Will start diabetic clear liquid diet.  HgA1C 9.3.  Will start metformin at discharge. S/s for now.      Tolerated diet and will discharge with metformin and jardiance.  Cipro/flagyl at discharge 7 days.  F/u with PCP with repeat labs

## 2024-07-03 NOTE — DISCHARGE SUMMARY
[facility] Surg ([facility])  [facility] Medicine  Discharge Summary      Patient Name: Greg Wynn  MRN: [id number]  [facility]: [id number]  Patient Class: OP- Observation  Admission Date: 7/1/2024  Hospital Length of Stay: 0 days  Discharge Date and Time:  07/03/2024 2:46 PM  Attending Physician: Laura Marroquin MD   Discharging Provider: Ashley Nava PA-C  Primary Care Provider: Evans Combs MD (Inactive)    Primary Care Team: Networked reference to record PCT     HPI:   Patient is a 24 year old male with medical history of obesity and fatty liver disease who presented to the ED with LLQ abdomina pain.  Symptoms started Saturday and describes it as stabbing pain.  Initially he was 9/10 pain and now 8/10 pain.   He has had constipation, nausea and vomiting.      Admitted for acute diverticulitis.        * No surgery found *      Hospital Course:   PT HD stable on room air.  Patient abdominal tenderness improving.  Will start diabetic clear liquid diet.  HgA1C 9.3.  Will start metformin at discharge. S/s for now.      Tolerated diet and will discharge with metformin and jardiance.  Cipro/flagyl at discharge 7 days.  F/u with PCP with repeat labs      Goals of Care Treatment Preferences:  Code Status: Full Code      Consults:   Consults (From admission, onward)          Status Ordering Provider     Inpatient consult to Registered Dietitian/Nutritionist  Once        Provider:  (Not yet assigned)    Acknowledged ASHLEY NAVA     Inpatient consult to Registered Dietitian/Nutritionist  Once        Provider:  (Not yet assigned)    Acknowledged ERICA MARTINEZ            Renal/  CKD stage 2 due to type 2 diabetes mellitus  Creatine stable for now. BMP reviewed- noted Estimated Creatinine Clearance: 222.1 mL/min (based on SCr of 0.8 mg/dL). according to latest data. Based on current GFR, CKD stage is stage 2 - GFR 60-89.  Monitor UOP and serial BMP and adjust therapy as needed. Renally dose meds. Avoid  nephrotoxic medications and procedures.    D/c with metformin, jardiance &lifestyle/dietary modifications     Oncology  Leukocytosis  Leukocytosis stable  Will monitor   Resolved      Endocrine  Severe obesity (BMI >= 40)  Body mass index is 51.12 kg/m². Morbid obesity complicates all aspects of disease management from diagnostic modalities to treatment. Dietary and lifestyle modifications.          GI  * Diverticulitis of sigmoid colon  LLQ pain  Leukocytosis   CTAP with diverticulitis of the proximal sigmoid colon.  IV flagyl/IV cipro  IV fluids/NPO     Abdominal pain improving.  Will attempt clear liquid diet.      7/3 Tolerated clear liquid diet.  Will discharge with cipro/flagyl          Final Active Diagnoses:    Diagnosis Date Noted POA    PRINCIPAL PROBLEM:  Diverticulitis of sigmoid colon [K57.32] 07/02/2024 Yes    CKD stage 2 due to type 2 diabetes mellitus [E11.22, N18.2] 07/03/2024 Yes    Leukocytosis [D72.829] 07/02/2024 Yes    Severe obesity (BMI >= 40) [E66.01] 07/02/2024 Yes      Problems Resolved During this Admission:       Discharged Condition: good    Disposition: Home or Self Care    Follow Up:   Follow-up Information       Evans Combs MD Follow up in 1 week(s).    Specialty: Pediatrics  Contact information:  [address] [place], [place] [address] [address] [phone]                           Patient Instructions:   No discharge procedures on file.    Significant Diagnostic Studies: see A&P     Pending Diagnostic Studies:       None           Medications:  Reconciled Home Medications:      Medication List        START taking these medications      ciprofloxacin HCl 500 MG tablet  Commonly known as: CIPRO  Take 1 tablet (500 mg total) by mouth every 12 (twelve) hours. for 7 days     empagliflozin 10 mg tablet  Commonly known as: JARDIANCE  Take 1 tablet (10 mg total) by mouth once daily.     metFORMIN 500 MG tablet  Commonly known as: GLUCOPHAGE  Take 1 tablet (500 mg total) by mouth 2 (two)  times daily with meals. Take 1 tablet once a day for 7 days, then take 1 tablet by mouth twice a day     metroNIDAZOLE 500 MG tablet  Commonly known as: FLAGYL  Take 1 tablet (500 mg total) by mouth every 8 (eight) hours. for 7 days              Indwelling Lines/Drains at time of discharge:   Lines/Drains/Airways       None                   Time spent on the discharge of patient: 35 minutes         Ashley Nava PA-C  Department of Hospital Medicine  [place] [facility] Surg (3rd Fl)

## 2024-07-03 NOTE — ASSESSMENT & PLAN NOTE
LLQ pain  Leukocytosis   CTAP with diverticulitis of the proximal sigmoid colon.  IV flagyl/IV cipro  IV fluids/NPO     Abdominal pain improving.  Will attempt clear liquid diet.      7/3 Tolerated clear liquid diet.  Will discharge with cipro/flagyl       declines

## 2024-07-03 NOTE — ASSESSMENT & PLAN NOTE
Creatine stable for now. BMP reviewed- noted Estimated Creatinine Clearance: 222.1 mL/min (based on SCr of 0.8 mg/dL). according to latest data. Based on current GFR, CKD stage is stage 2 - GFR 60-89.  Monitor UOP and serial BMP and adjust therapy as needed. Renally dose meds. Avoid nephrotoxic medications and procedures.    D/c with metformin &lifestyle/dietary modifications

## 2024-07-03 NOTE — PROGRESS NOTES
[facility] ([address] [place] [address] Medicine  Progress Note    Patient Name: Greg Wynn  MRN: [id number]  Patient Class: OP- Observation   Admission Date: 7/1/2024  Length of Stay: 0 days  Attending Physician: Laura Marroquin MD  Primary Care Provider: Evans Combs MD (Inactive)        Subjective:     Principal Problem:Diverticulitis of sigmoid colon        HPI:  Patient is a 24 year old male with medical history of obesity and fatty liver disease who presented to the ED with LLQ abdomina pain.  Symptoms started Saturday and describes it as stabbing pain.  Initially he was 9/10 pain and now 8/10 pain.   He has had constipation, nausea and vomiting.      Admitted for acute diverticulitis.        Overview/Hospital Course:  PT HD stable on room air.  Patient abdominal tenderness improving.  Will start diabetic clear liquid diet.  HgA1C 9.3.  Will start metformin at discharge. S/s for now.      Past Medical History:   Diagnosis Date    ADHD (attention deficit hyperactivity disorder)        Past Surgical History:   Procedure Laterality Date    CHOLECYSTECTOMY  06/20/2018    LAPAROSCOPIC CHOLECYSTECTOMY N/A 6/20/2018    Procedure: CHOLECYSTECTOMY, LAPAROSCOPIC;  Surgeon: Luis Bogran-Reyes, MD;  Location: [facility];  Service: General;  Laterality: N/A;       Review of patient's allergies indicates:  No Known Allergies    No current facility-administered medications on file prior to encounter.     No current outpatient medications on file prior to encounter.     Family History       Problem Relation (Age of Onset)    Allergies Maternal Grandmother    Cancer Maternal Aunt    Depression Paternal Aunt    Diabetes Mother, Maternal Grandfather    Heart disease Mother, Father    Hypertension Mother, Father, Paternal Grandfather    No Known Problems Sister, Brother, Maternal Uncle, Paternal Uncle    Other Mother, Father, Paternal Grandmother          Tobacco Use    Smoking status: Never     Passive exposure: Past     Smokeless tobacco: Never   Substance and Sexual Activity    Alcohol use: No    Drug use: No    Sexual activity: Never     Review of Systems   Constitutional:  Negative for chills and fever.   HENT:  Negative for ear discharge and ear pain.    Eyes:  Negative for pain and discharge.   Respiratory:  Negative for cough and shortness of breath.    Cardiovascular:  Negative for chest pain and leg swelling.   Gastrointestinal:  Positive for abdominal pain (improving). Negative for abdominal distention, constipation, nausea and vomiting.   Endocrine: Negative for cold intolerance and heat intolerance.   Genitourinary:  Negative for difficulty urinating and dysuria.   Musculoskeletal:  Negative for joint swelling and myalgias.   Skin:  Negative for rash and wound.   Neurological:  Negative for dizziness and headaches.   Psychiatric/Behavioral:  Negative for agitation and confusion.      Objective:     Vital Signs (Most Recent):  Temp: 97.7 °F (36.5 °C) (07/03/24 0757)  Pulse: 89 (07/03/24 1001)  Resp: 18 (07/03/24 0757)  BP: 137/74 (07/03/24 0757)  SpO2: (!) 94 % (07/03/24 0757) Vital Signs (24h Range):  Temp:  [97.7 °F (36.5 °C)-99.5 °F (37.5 °C)] 97.7 °F (36.5 °C)  Pulse:  [85-99] 89  Resp:  [18-20] 18  SpO2:  [92 %-97 %] 94 %  BP: (114-143)/(56-82) 137/74     Weight: (!) 161.6 kg (356 lb 4.2 oz)  Body mass index is 51.12 kg/m².     Physical Exam  Constitutional:       General: He is not in acute distress.  HENT:      Head: Normocephalic and atraumatic.   Eyes:      General:         Right eye: No discharge.         Left eye: No discharge.   Cardiovascular:      Rate and Rhythm: Normal rate and regular rhythm.   Pulmonary:      Effort: Pulmonary effort is normal.      Breath sounds: Normal breath sounds.   Abdominal:      General: Bowel sounds are normal. There is no distension.      Tenderness: There is no abdominal tenderness.   Musculoskeletal:         General: No swelling or tenderness.      Cervical back: Neck supple.  "No tenderness.   Skin:     General: Skin is warm and dry.   Neurological:      General: No focal deficit present.      Mental Status: He is alert and oriented to person, place, and time.   Psychiatric:         Mood and Affect: Mood normal.         Behavior: Behavior normal.                Significant Labs: A1C:   Recent Labs   Lab 07/02/24  0404   HGBA1C 9.3*     ABGs: No results for input(s): "PH", "PCO2", "HCO3", "POCSATURATED", "BE", "TOTALHB", "COHB", "METHB", "O2HB", "POCFIO2", "PO2" in the last 48 hours.  Bilirubin:   Recent Labs   Lab 07/01/24  1423 07/02/24  0404 07/03/24  0600   BILITOT 0.8 1.5* 0.9     Blood Culture: No results for input(s): "LABBLOO" in the last 48 hours.  BMP:   Recent Labs   Lab 07/03/24  0600   *      K 3.7      CO2 25   BUN 8   CREATININE 0.8   CALCIUM 9.2   MG 1.8     CBC:   Recent Labs   Lab 07/01/24  1423 07/02/24  0404 07/03/24  0600   WBC 13.56* 14.60* 11.66   HGB 13.8* 12.9* 11.8*   HCT 41.6 40.3 36.7*    306 273     CMP:   Recent Labs   Lab 07/01/24  1423 07/02/24  0404 07/03/24  0600    138 141   K 3.9 4.1 3.7    103 107   CO2 27 27 25   * 224* 170*   BUN 12 9 8   CREATININE 0.8 1.1 0.8   CALCIUM 9.4 9.1 9.2   PROT 7.7 7.1 6.8   ALBUMIN 3.7 3.2* 2.7*   BILITOT 0.8 1.5* 0.9   ALKPHOS 101 90 81   AST 28 48* 32   ALT 57* 68* 56*   ANIONGAP 8 8 9     Cardiac Markers: No results for input(s): "CKMB", "MYOGLOBIN", "BNP", "TROPISTAT" in the last 48 hours.  Coagulation: No results for input(s): "PT", "INR", "APTT" in the last 48 hours.  Lactic Acid: No results for input(s): "LACTATE" in the last 48 hours.  Lipase:   Recent Labs   Lab 07/01/24  1423   LIPASE 9     Lipid Panel: No results for input(s): "CHOL", "HDL", "LDLCALC", "TRIG", "CHOLHDL" in the last 48 hours.  Magnesium:   Recent Labs   Lab 07/03/24  0600   MG 1.8     POCT Glucose:   Recent Labs   Lab 07/02/24  1820 07/02/24  2319 07/03/24  0507   POCTGLUCOSE 201* 165* 185* " "    Prealbumin: No results for input(s): "PREALBUMIN" in the last 48 hours.  Respiratory Culture: No results for input(s): "GSRESP", "RESPIRATORYC" in the last 48 hours.  Troponin: No results for input(s): "TROPONINI", "TROPONINIHS" in the last 48 hours.  TSH: No results for input(s): "TSH" in the last 4320 hours.  Urine Culture: No results for input(s): "LABURIN" in the last 48 hours.  Urine Studies:   Recent Labs   Lab 07/01/24  1610   COLORU Yellow   APPEARANCEUA Clear   PHUR 7.0   SPECGRAV 1.010   PROTEINUA 1+*   GLUCUA 2+*   KETONESU 1+*   BILIRUBINUA Negative   OCCULTUA Negative   NITRITE Negative   UROBILINOGEN 1.0   LEUKOCYTESUR Negative   RBCUA 1   WBCUA 1   BACTERIA Rare   HYALINECASTS 1       Significant Imaging: I have reviewed all pertinent imaging results/findings within the past 24 hours.    Assessment/Plan:      * Diverticulitis of sigmoid colon  LLQ pain  Leukocytosis   CTAP with diverticulitis of the proximal sigmoid colon.  IV flagyl/IV cipro  IV fluids/NPO     Abdominal pain improving.  Will attempt clear liquid diet.          CKD stage 2 due to type 2 diabetes mellitus  Creatine stable for now. BMP reviewed- noted Estimated Creatinine Clearance: 222.1 mL/min (based on SCr of 0.8 mg/dL). according to latest data. Based on current GFR, CKD stage is stage 2 - GFR 60-89.  Monitor UOP and serial BMP and adjust therapy as needed. Renally dose meds. Avoid nephrotoxic medications and procedures.    D/c with metformin &lifestyle/dietary modifications     Severe obesity (BMI >= 40)  Body mass index is 51.12 kg/m². Morbid obesity complicates all aspects of disease management from diagnostic modalities to treatment. Dietary and lifestyle modifications.          Leukocytosis  Leukocytosis stable  Will monitor   Resolved        VTE Risk Mitigation (From admission, onward)           Ordered     IP VTE HIGH RISK PATIENT  Once         07/01/24 0924     Place sequential compression device  Until discontinued      "    07/01/24 1748                    Discharge Planning   CHERYL:      Code Status: Full Code   Is the patient medically ready for discharge?:     Reason for patient still in hospital (select all that apply): Patient trending condition  Discharge Plan A: Home                  Ashley Nava PA-C  Department of Hospital Medicine   [facility] ([facility])

## 2024-07-05 ENCOUNTER — PATIENT OUTREACH (OUTPATIENT)
Dept: ADMINISTRATIVE | Facility: CLINIC | Age: 24
End: 2024-07-05
Payer: COMMERCIAL

## 2024-07-05 NOTE — PROGRESS NOTES
C3 nurse attempted to contact Greg Wynn for a TCC post hospital discharge follow up call. No answer. The patient does not have a scheduled HOSFU appointment, and the pt does not have an [facility] PCP.

## 2024-07-10 ENCOUNTER — LAB VISIT (OUTPATIENT)
Dept: LAB | Facility: HOSPITAL | Age: 24
End: 2024-07-10
Attending: PHYSICIAN ASSISTANT
Payer: COMMERCIAL

## 2024-07-10 DIAGNOSIS — N18.2 CKD STAGE 2 DUE TO TYPE 2 DIABETES MELLITUS: ICD-10-CM

## 2024-07-10 DIAGNOSIS — E11.22 CKD STAGE 2 DUE TO TYPE 2 DIABETES MELLITUS: ICD-10-CM

## 2024-07-10 LAB
ANION GAP SERPL CALC-SCNC: 11 MMOL/L (ref 8–16)
BASOPHILS # BLD AUTO: 0.09 K/UL (ref 0–0.2)
BASOPHILS NFR BLD: 1 % (ref 0–1.9)
BUN SERPL-MCNC: 13 MG/DL (ref 6–20)
CALCIUM SERPL-MCNC: 9.9 MG/DL (ref 8.7–10.5)
CHLORIDE SERPL-SCNC: 104 MMOL/L (ref 95–110)
CO2 SERPL-SCNC: 24 MMOL/L (ref 23–29)
CREAT SERPL-MCNC: 1 MG/DL (ref 0.5–1.4)
DIFFERENTIAL METHOD BLD: ABNORMAL
EOSINOPHIL # BLD AUTO: 0.2 K/UL (ref 0–0.5)
EOSINOPHIL NFR BLD: 2.1 % (ref 0–8)
ERYTHROCYTE [DISTWIDTH] IN BLOOD BY AUTOMATED COUNT: 12.5 % (ref 11.5–14.5)
EST. GFR  (NO RACE VARIABLE): >60 ML/MIN/1.73 M^2
GLUCOSE SERPL-MCNC: 129 MG/DL (ref 70–110)
HCT VFR BLD AUTO: 43.9 % (ref 40–54)
HGB BLD-MCNC: 14.3 G/DL (ref 14–18)
IMM GRANULOCYTES # BLD AUTO: 0.12 K/UL (ref 0–0.04)
IMM GRANULOCYTES NFR BLD AUTO: 1.4 % (ref 0–0.5)
LYMPHOCYTES # BLD AUTO: 2 K/UL (ref 1–4.8)
LYMPHOCYTES NFR BLD: 23.1 % (ref 18–48)
MAGNESIUM SERPL-MCNC: 2 MG/DL (ref 1.6–2.6)
MCH RBC QN AUTO: 27.2 PG (ref 27–31)
MCHC RBC AUTO-ENTMCNC: 32.6 G/DL (ref 32–36)
MCV RBC AUTO: 84 FL (ref 82–98)
MONOCYTES # BLD AUTO: 0.7 K/UL (ref 0.3–1)
MONOCYTES NFR BLD: 7.8 % (ref 4–15)
NEUTROPHILS # BLD AUTO: 5.5 K/UL (ref 1.8–7.7)
NEUTROPHILS NFR BLD: 64.6 % (ref 38–73)
NRBC BLD-RTO: 0 /100 WBC
PLATELET # BLD AUTO: 496 K/UL (ref 150–450)
PMV BLD AUTO: 9.8 FL (ref 9.2–12.9)
POTASSIUM SERPL-SCNC: 4.1 MMOL/L (ref 3.5–5.1)
RBC # BLD AUTO: 5.26 M/UL (ref 4.6–6.2)
SODIUM SERPL-SCNC: 139 MMOL/L (ref 136–145)
WBC # BLD AUTO: 8.58 K/UL (ref 3.9–12.7)

## 2024-07-10 PROCEDURE — 85025 COMPLETE CBC W/AUTO DIFF WBC: CPT | Performed by: PHYSICIAN ASSISTANT

## 2024-07-10 PROCEDURE — 80048 BASIC METABOLIC PNL TOTAL CA: CPT | Performed by: PHYSICIAN ASSISTANT

## 2024-07-10 PROCEDURE — 36415 COLL VENOUS BLD VENIPUNCTURE: CPT | Performed by: PHYSICIAN ASSISTANT

## 2024-07-10 PROCEDURE — 83735 ASSAY OF MAGNESIUM: CPT | Performed by: PHYSICIAN ASSISTANT

## 2024-11-06 ENCOUNTER — HOSPITAL ENCOUNTER (OUTPATIENT)
Facility: HOSPITAL | Age: 24
Discharge: HOME | End: 2024-11-08
Attending: STUDENT IN AN ORGANIZED HEALTH CARE EDUCATION/TRAINING PROGRAM | Admitting: FAMILY MEDICINE
Payer: COMMERCIAL

## 2024-11-06 DIAGNOSIS — K57.32 DIVERTICULITIS OF LARGE INTESTINE WITHOUT PERFORATION OR ABSCESS WITHOUT BLEEDING: Primary | ICD-10-CM

## 2024-11-06 LAB
ALBUMIN SERPL BCP-MCNC: 4.2 G/DL (ref 3.5–5.2)
ALP SERPL-CCNC: 86 U/L (ref 40–150)
ALT SERPL W/O P-5'-P-CCNC: 60 U/L (ref 10–44)
ANION GAP SERPL CALC-SCNC: 10 MMOL/L (ref 8–16)
AST SERPL-CCNC: 36 U/L (ref 10–40)
BASOPHILS # BLD AUTO: 0.05 K/UL (ref 0–0.2)
BASOPHILS NFR BLD: 0.4 % (ref 0–1.9)
BILIRUB SERPL-MCNC: 0.9 MG/DL (ref 0.1–1)
BILIRUB UR QL STRIP: NEGATIVE
BUN SERPL-MCNC: 14 MG/DL (ref 6–20)
CALCIUM SERPL-MCNC: 9.7 MG/DL (ref 8.7–10.5)
CHLORIDE SERPL-SCNC: 104 MMOL/L (ref 95–110)
CLARITY UR: CLEAR
CO2 SERPL-SCNC: 26 MMOL/L (ref 23–29)
COLOR UR: YELLOW
CREAT SERPL-MCNC: 0.9 MG/DL (ref 0.5–1.4)
DIFFERENTIAL METHOD BLD: ABNORMAL
EOSINOPHIL # BLD AUTO: 0 K/UL (ref 0–0.5)
EOSINOPHIL NFR BLD: 0.3 % (ref 0–8)
ERYTHROCYTE [DISTWIDTH] IN BLOOD BY AUTOMATED COUNT: 13.6 % (ref 11.5–14.5)
EST. GFR  (NO RACE VARIABLE): >60 ML/MIN/1.73 M^2
GLUCOSE SERPL-MCNC: 128 MG/DL (ref 70–110)
GLUCOSE UR QL STRIP: ABNORMAL
HCT VFR BLD AUTO: 45.6 % (ref 40–54)
HGB BLD-MCNC: 15.2 G/DL (ref 14–18)
HGB UR QL STRIP: NEGATIVE
IMM GRANULOCYTES # BLD AUTO: 0.05 K/UL (ref 0–0.04)
IMM GRANULOCYTES NFR BLD AUTO: 0.4 % (ref 0–0.5)
KETONES UR QL STRIP: ABNORMAL
LACTATE SERPL-SCNC: 1.2 MMOL/L (ref 0.5–2.2)
LEUKOCYTE ESTERASE UR QL STRIP: NEGATIVE
LIPASE SERPL-CCNC: 10 U/L (ref 4–60)
LYMPHOCYTES # BLD AUTO: 0.5 K/UL (ref 1–4.8)
LYMPHOCYTES NFR BLD: 3.8 % (ref 18–48)
MCH RBC QN AUTO: 27.7 PG (ref 27–31)
MCHC RBC AUTO-ENTMCNC: 33.3 G/DL (ref 32–36)
MCV RBC AUTO: 83 FL (ref 82–98)
MONOCYTES # BLD AUTO: 0.4 K/UL (ref 0.3–1)
MONOCYTES NFR BLD: 2.8 % (ref 4–15)
NEUTROPHILS # BLD AUTO: 12 K/UL (ref 1.8–7.7)
NEUTROPHILS NFR BLD: 92.3 % (ref 38–73)
NITRITE UR QL STRIP: NEGATIVE
NRBC BLD-RTO: 0 /100 WBC
PH UR STRIP: 5 [PH] (ref 5–8)
PLATELET # BLD AUTO: 325 K/UL (ref 150–450)
PMV BLD AUTO: 10 FL (ref 9.2–12.9)
POTASSIUM SERPL-SCNC: 4.2 MMOL/L (ref 3.5–5.1)
PROT SERPL-MCNC: 8.1 G/DL (ref 6–8.4)
PROT UR QL STRIP: ABNORMAL
RBC # BLD AUTO: 5.48 M/UL (ref 4.6–6.2)
SODIUM SERPL-SCNC: 140 MMOL/L (ref 136–145)
SP GR UR STRIP: 1.02 (ref 1–1.03)
URN SPEC COLLECT METH UR: ABNORMAL
UROBILINOGEN UR STRIP-ACNC: NEGATIVE EU/DL
WBC # BLD AUTO: 13.04 K/UL (ref 3.9–12.7)

## 2024-11-06 PROCEDURE — 96361 HYDRATE IV INFUSION ADD-ON: CPT

## 2024-11-06 PROCEDURE — 81003 URINALYSIS AUTO W/O SCOPE: CPT | Performed by: NURSE PRACTITIONER

## 2024-11-06 PROCEDURE — 83690 ASSAY OF LIPASE: CPT | Performed by: NURSE PRACTITIONER

## 2024-11-06 PROCEDURE — 96365 THER/PROPH/DIAG IV INF INIT: CPT

## 2024-11-06 PROCEDURE — 25000003 PHARM REV CODE 250: Performed by: NURSE PRACTITIONER

## 2024-11-06 PROCEDURE — 83036 HEMOGLOBIN GLYCOSYLATED A1C: CPT | Performed by: NURSE PRACTITIONER

## 2024-11-06 PROCEDURE — G0378 HOSPITAL OBSERVATION PER HR: HCPCS

## 2024-11-06 PROCEDURE — 87040 BLOOD CULTURE FOR BACTERIA: CPT | Mod: 59 | Performed by: NURSE PRACTITIONER

## 2024-11-06 PROCEDURE — 83605 ASSAY OF LACTIC ACID: CPT | Performed by: NURSE PRACTITIONER

## 2024-11-06 PROCEDURE — 63600175 PHARM REV CODE 636 W HCPCS: Performed by: NURSE PRACTITIONER

## 2024-11-06 PROCEDURE — 80053 COMPREHEN METABOLIC PANEL: CPT | Performed by: NURSE PRACTITIONER

## 2024-11-06 PROCEDURE — 99285 EMERGENCY DEPT VISIT HI MDM: CPT | Mod: 25

## 2024-11-06 PROCEDURE — 96375 TX/PRO/DX INJ NEW DRUG ADDON: CPT

## 2024-11-06 PROCEDURE — 85025 COMPLETE CBC W/AUTO DIFF WBC: CPT | Performed by: NURSE PRACTITIONER

## 2024-11-06 PROCEDURE — 25500020 PHARM REV CODE 255: Performed by: STUDENT IN AN ORGANIZED HEALTH CARE EDUCATION/TRAINING PROGRAM

## 2024-11-06 RX ORDER — SODIUM CHLORIDE 9 MG/ML
INJECTION, SOLUTION INTRAVENOUS CONTINUOUS
Status: DISCONTINUED | OUTPATIENT
Start: 2024-11-06 | End: 2024-11-07

## 2024-11-06 RX ORDER — ACETAMINOPHEN 325 MG/1
650 TABLET ORAL EVERY 8 HOURS PRN
Status: DISCONTINUED | OUTPATIENT
Start: 2024-11-06 | End: 2024-11-08 | Stop reason: HOSPADM

## 2024-11-06 RX ORDER — INSULIN ASPART 100 [IU]/ML
0-5 INJECTION, SOLUTION INTRAVENOUS; SUBCUTANEOUS EVERY 6 HOURS PRN
Status: DISCONTINUED | OUTPATIENT
Start: 2024-11-06 | End: 2024-11-08 | Stop reason: HOSPADM

## 2024-11-06 RX ORDER — TALC
6 POWDER (GRAM) TOPICAL NIGHTLY PRN
Status: DISCONTINUED | OUTPATIENT
Start: 2024-11-06 | End: 2024-11-08 | Stop reason: HOSPADM

## 2024-11-06 RX ORDER — GLUCAGON 1 MG
1 KIT INJECTION
Status: DISCONTINUED | OUTPATIENT
Start: 2024-11-06 | End: 2024-11-08 | Stop reason: HOSPADM

## 2024-11-06 RX ORDER — SODIUM CHLORIDE 0.9 % (FLUSH) 0.9 %
10 SYRINGE (ML) INJECTION
Status: DISCONTINUED | OUTPATIENT
Start: 2024-11-06 | End: 2024-11-08 | Stop reason: HOSPADM

## 2024-11-06 RX ORDER — MORPHINE SULFATE 2 MG/ML
4 INJECTION, SOLUTION INTRAMUSCULAR; INTRAVENOUS
Status: ACTIVE | OUTPATIENT
Start: 2024-11-06 | End: 2024-11-07

## 2024-11-06 RX ORDER — SODIUM CHLORIDE 9 MG/ML
1000 INJECTION, SOLUTION INTRAVENOUS
Status: COMPLETED | OUTPATIENT
Start: 2024-11-06 | End: 2024-11-06

## 2024-11-06 RX ORDER — ONDANSETRON HYDROCHLORIDE 2 MG/ML
4 INJECTION, SOLUTION INTRAVENOUS
Status: COMPLETED | OUTPATIENT
Start: 2024-11-06 | End: 2024-11-06

## 2024-11-06 RX ORDER — MORPHINE SULFATE 4 MG/ML
4 INJECTION, SOLUTION INTRAMUSCULAR; INTRAVENOUS EVERY 4 HOURS PRN
Status: DISCONTINUED | OUTPATIENT
Start: 2024-11-06 | End: 2024-11-07

## 2024-11-06 RX ORDER — ACETAMINOPHEN 500 MG
1000 TABLET ORAL
Status: COMPLETED | OUTPATIENT
Start: 2024-11-06 | End: 2024-11-06

## 2024-11-06 RX ORDER — ONDANSETRON HYDROCHLORIDE 2 MG/ML
4 INJECTION, SOLUTION INTRAVENOUS EVERY 8 HOURS PRN
Status: DISCONTINUED | OUTPATIENT
Start: 2024-11-06 | End: 2024-11-08 | Stop reason: HOSPADM

## 2024-11-06 RX ADMIN — SODIUM CHLORIDE: 9 INJECTION, SOLUTION INTRAVENOUS at 10:11

## 2024-11-06 RX ADMIN — SODIUM CHLORIDE 1000 ML: 9 INJECTION, SOLUTION INTRAVENOUS at 07:11

## 2024-11-06 RX ADMIN — IOHEXOL 100 ML: 350 INJECTION, SOLUTION INTRAVENOUS at 06:11

## 2024-11-06 RX ADMIN — ONDANSETRON 4 MG: 2 INJECTION INTRAMUSCULAR; INTRAVENOUS at 07:11

## 2024-11-06 RX ADMIN — PIPERACILLIN AND TAZOBACTAM 4.5 G: 4; .5 INJECTION, POWDER, LYOPHILIZED, FOR SOLUTION INTRAVENOUS; PARENTERAL at 09:11

## 2024-11-06 RX ADMIN — ACETAMINOPHEN 1000 MG: 500 TABLET ORAL at 07:11

## 2024-11-06 NOTE — Clinical Note
Diagnosis: Diverticulitis of large intestine without perforation or abscess without bleeding [[id number]]   Future Attending Provider: FILI ARRIAGA [[id number]]

## 2024-11-06 NOTE — ED NOTES
Hourly Patient Rounds   Patient remains in stable condition. Resp e/u. Denies needs and complaints. Pt AAO x 4.   Updated pt on plan of care, Denies any questions and concerns at this time. No acute distress noted at this time. Call light within easy reach. Will continue to monitor for changes. Door closed for privacy.

## 2024-11-06 NOTE — LETTER
November 8, 2024    Greg Wynn  [address] [facility] [id number] [address] [facility] [address]  Phone: [phone]  Fax: [phone]   November 8, 2024     Patient: Greg Wynn       Date of Visit: 11/6/2024       To Whom it May Concern:    Greg Wynn was seen in the hospital on 11/6/2024 to 11/8/2024.     Please excuse him from any work missed.    If you have any questions or concerns, please don't hesitate to call.    Sincerely,         FABIOLA Dickens

## 2024-11-06 NOTE — ED PROVIDER NOTES
Encounter Date: 11/6/2024       History     Chief Complaint   Patient presents with    Abdominal Pain     Pt arrives to the ed with c/o abd pain x 2 weeks, nausea, and vomiting x today. Hx diverticulitis. Seen pcp and was put on anx, but pain is still there. Pt reports kids have stomach bug at this time.      Greg Wynn is a 24 y.o. male with PMH of ADHD presenting to the ED for evaluation of abdominal pain.  Patient presents with a 2 week history of left-sided abdominal pain.  Persistent pain that is progressively worsening over the course of the last 2 weeks.  He was evaluated by his PCP, Jyoti Freire, on 10/31/24 and placed on PO Cipro and Flagyl.  He has been compliant with medication, but pain continues.  Today, he developed nausea with 1 episode of bilious, nonbloody emesis.  He denies fever or myalgias.  Denies loose or bloody/mucus stool.  He does note prior history of diverticulitis.    The history is provided by the patient.     Review of patient's allergies indicates:  No Known Allergies  Past Medical History:   Diagnosis Date    ADHD (attention deficit hyperactivity disorder)      Past Surgical History:   Procedure Laterality Date    CHOLECYSTECTOMY  06/20/2018    LAPAROSCOPIC CHOLECYSTECTOMY N/A 6/20/2018    Procedure: CHOLECYSTECTOMY, LAPAROSCOPIC;  Surgeon: Luis Bogran-Reyes, MD;  Location: [facility];  Service: General;  Laterality: N/A;     Family History   Problem Relation Name Age of Onset    Diabetes Mother      Hypertension Mother      Other Mother          fluid extractions from heart     Heart disease Mother          double bypass    Hypertension Father      Other Father          heart attack    Heart disease Father      No Known Problems Sister      No Known Problems Brother      Cancer Maternal Aunt      No Known Problems Maternal Uncle      Depression Paternal Aunt      No Known Problems Paternal Uncle      Allergies Maternal Grandmother      Diabetes Maternal Grandfather      Other  Paternal Grandmother      Cancer Paternal Grandfather      Hypertension Paternal Grandfather      ADD / ADHD Neg Hx      Alcohol abuse Neg Hx      Asthma Neg Hx      Autism spectrum disorder Neg Hx      Behavior problems Neg Hx      Birth defects Neg Hx      Chromosomal disorder Neg Hx      Cleft lip Neg Hx      Congenital heart disease Neg Hx      Early death Neg Hx      Eczema Neg Hx      Hearing loss Neg Hx      Hyperlipidemia Neg Hx      Kidney disease Neg Hx      Learning disabilities Neg Hx      Mental illness Neg Hx      Migraines Neg Hx      Neurodegenerative disease Neg Hx      Obesity Neg Hx      Seizures Neg Hx      SIDS Neg Hx      Thyroid disease Neg Hx       Social History     Tobacco Use    Smoking status: Never     Passive exposure: Past    Smokeless tobacco: Never   Substance Use Topics    Alcohol use: No    Drug use: No     Review of Systems   Constitutional:  Positive for appetite change. Negative for chills and fever.   HENT:  Negative for congestion, ear discharge, ear pain, postnasal drip, rhinorrhea and sore throat.    Respiratory:  Negative for cough, chest tightness and shortness of breath.    Cardiovascular:  Negative for chest pain.   Gastrointestinal:  Positive for abdominal pain (L sided), nausea and vomiting. Negative for abdominal distention.   Genitourinary:  Negative for dysuria, flank pain, hematuria and urgency.   Musculoskeletal:  Negative for arthralgias and back pain.   Skin:  Negative for rash.   Neurological:  Negative for dizziness, weakness, numbness and headaches.   Hematological:  Does not bruise/bleed easily.       Physical Exam     Initial Vitals [11/06/24 1539]   BP Pulse Resp Temp SpO2   (!) 173/94 97 18 98.2 °F (36.8 °C) 97 %      MAP       --         Physical Exam    Nursing note and vitals reviewed.  Constitutional: He appears well-developed and well-nourished.   HENT:   Head: Normocephalic and atraumatic.   Eyes: Conjunctivae and EOM are normal. Pupils are equal,  round, and reactive to light.   Neck: Neck supple.   Cardiovascular:  Normal rate, regular rhythm, normal heart sounds and intact distal pulses.           Pulmonary/Chest: Breath sounds normal.   Abdominal: Abdomen is soft. Bowel sounds are normal. There is abdominal tenderness in the left upper quadrant and left lower quadrant. There is guarding.   Musculoskeletal:         General: Normal range of motion.      Cervical back: Neck supple.     Neurological: He is alert and oriented to person, place, and time. He has normal strength.   Skin: Skin is warm and dry.   Psychiatric: He has a normal mood and affect. His behavior is normal. Judgment and thought content normal.         ED Course   Procedures  Labs Reviewed   CBC W/ AUTO DIFFERENTIAL - Abnormal       Result Value    WBC 13.04 (*)     RBC 5.48      Hemoglobin 15.2      Hematocrit 45.6      MCV 83      MCH 27.7      MCHC 33.3      RDW 13.6      Platelets 325      MPV 10.0      Immature Granulocytes 0.4      Gran # (ANC) 12.0 (*)     Immature Grans (Abs) 0.05 (*)     Lymph # 0.5 (*)     Mono # 0.4      Eos # 0.0      Baso # 0.05      nRBC 0      Gran % 92.3 (*)     Lymph % 3.8 (*)     Mono % 2.8 (*)     Eosinophil % 0.3      Basophil % 0.4      Differential Method Automated     COMPREHENSIVE METABOLIC PANEL - Abnormal    Sodium 140      Potassium 4.2      Chloride 104      CO2 26      Glucose 128 (*)     BUN 14      Creatinine 0.9      Calcium 9.7      Total Protein 8.1      Albumin 4.2      Total Bilirubin 0.9      Alkaline Phosphatase 86      AST 36      ALT 60 (*)     eGFR >60      Anion Gap 10     URINALYSIS, REFLEX TO URINE CULTURE - Abnormal    Specimen UA Urine, Clean Catch      Color, UA Yellow      Appearance, UA Clear      pH, UA 5.0      Specific Gravity, UA 1.025      Protein, UA Trace (*)     Glucose, UA 2+ (*)     Ketones, UA Trace (*)     Bilirubin (UA) Negative      Occult Blood UA Negative      Nitrite, UA Negative      Urobilinogen, UA Negative       Leukocytes, UA Negative      Narrative:     Specimen Source->Urine   CULTURE, BLOOD   CULTURE, BLOOD   LIPASE    Lipase 10     LACTIC ACID, PLASMA    Lactate (Lactic Acid) 1.2            Imaging Results              CT Abdomen Pelvis With IV Contrast NO Oral Contrast (Final result)  Result time 11/06/24 20:34:31      Final result by Nidia Nguyen MD (11/06/24 20:34:31)                   Impression:      Mild acute diverticulitis of the proximal sigmoid colon. No perforation or abscess.  Prominent fluid-filled loops of small and large bowel suggestive of infectious/inflammatory enterocolitis with diarrheal illness.      Electronically signed by: Nidia Nguyen  Date:    11/06/2024  Time:    20:34               Narrative:    EXAMINATION:  CT ABDOMEN PELVIS WITH IV CONTRAST    CLINICAL HISTORY:  LLQ abdominal pain;    TECHNIQUE:  Low dose axial images, sagittal and coronal reformations were obtained from the lung bases to the pubic symphysis following the IV administration of 100 mL of Omnipaque 350 .  Oral contrast was not administered.    COMPARISON:  07/01/2024    FINDINGS:  Abdomen:    - Lung bases: No infiltrates and no nodules.    - Liver: Fatty liver.    - Gallbladder: .  Cholecystectomy.    - Bile Ducts: No evidence of intra or extra hepatic biliary ductal dilation.    - Spleen: Negative.    - Kidneys: No mass or hydronephrosis.    - Adrenals: Unremarkable.    - Pancreas: No mass or peripancreatic fat stranding.    - Retroperitoneum:  No significant adenopathy.    - Vascular: No abdominal aortic aneurysm.    - Abdominal wall:  Unremarkable.    Pelvis:    No pelvic mass, adenopathy, or free fluid.    Bowel/Mesentery:    Mild acute diverticulitis of the proximal sigmoid colon.  No perforation or abscess.  Prominent fluid-filled loops of small and large bowel suggestive of infectious/inflammatory enterocolitis with diarrheal illness.  Normal appendix.    Bones:  No acute osseous abnormality and no  suspicious lytic or blastic lesion.                                       Medications   morphine injection 4 mg (4 mg Intravenous Not Given 11/6/24 1904)   piperacillin-tazobactam (ZOSYN) 4.5 g in D5W 100 mL IVPB (MB+) (has no administration in time range)   iohexoL (OMNIPAQUE 350) injection 100 mL (100 mLs Intravenous Given 11/6/24 1851)   acetaminophen tablet 1,000 mg (1,000 mg Oral Given 11/6/24 1903)   0.9%  NaCl infusion (1,000 mLs Intravenous New Bag 11/6/24 1904)   ondansetron injection 4 mg (4 mg Intravenous Given 11/6/24 1905)     Medical Decision Making  Evaluation of a 24-year-old male presenting with left-sided abdominal pain for the past 2 weeks  Presents ill-appearing with stable vital signs  Physical exam with + left-sided upper and lower abdominal tenderness with guarding  Active bowel sounds  Patient currently taking Cipro Flagyl prescribed by PCP    Differential diagnosis includes acute diverticulitis, diverticulitis with perforation/abscess, UTI, kidney stone, colitis, constipation, dehydration    Problems Addressed:  Diverticulitis of large intestine without perforation or abscess without bleeding: acute illness or injury    Amount and/or Complexity of Data Reviewed  Labs: ordered. Decision-making details documented in ED Course.  Radiology: ordered. Decision-making details documented in ED Course.    Risk  OTC drugs.  Prescription drug management.  Risk Details: Patient presented with left-sided abdominal pain.  Lab workup with mild leukocytosis, WBC 13.  CMP is grossly normal.  UA with no signs of infection.  Negative lactic.  Blood cultures x2 pending.  CT abdomen pelvis shows mild, acute diverticulitis of the sigmoid colon without perforation or abscess.  Prominent fluid fill filled loops of bowel of small and large bowel suggestive of diarrheal illness.     Patient with failed outpatient treatment; now with vomiting and inability to tolerate PO intake. Enteritis in diff; patient does have  two children at home with same symptoms; however, CT showing diverticulitis. He has significant LLQ tenderness with complicated hx of diverticulitis. Spoke to Dr. Bowen; will admit to hospital medicine and administer IV Zosyn for failed OP treatment. Stool cx ordered given diarrheal illness with recent use of AB. General surgery consult in AM.                                       Clinical Impression:  Final diagnoses:  [K57.32] Diverticulitis of large intestine without perforation or abscess without bleeding (Primary)          ED Disposition Condition    Observation Stable                Zayra Lu NP  11/06/24 6888

## 2024-11-07 PROBLEM — E11.9 TYPE 2 DIABETES MELLITUS, WITHOUT LONG-TERM CURRENT USE OF INSULIN: Status: ACTIVE | Noted: 2024-11-07

## 2024-11-07 PROBLEM — A41.9 SEPSIS: Status: ACTIVE | Noted: 2024-11-07

## 2024-11-07 LAB
ALBUMIN SERPL BCP-MCNC: 3.6 G/DL (ref 3.5–5.2)
ALP SERPL-CCNC: 66 U/L (ref 40–150)
ALT SERPL W/O P-5'-P-CCNC: 53 U/L (ref 10–44)
ANION GAP SERPL CALC-SCNC: 7 MMOL/L (ref 8–16)
AST SERPL-CCNC: 33 U/L (ref 10–40)
BASOPHILS # BLD AUTO: 0.05 K/UL (ref 0–0.2)
BASOPHILS NFR BLD: 0.7 % (ref 0–1.9)
BILIRUB SERPL-MCNC: 1 MG/DL (ref 0.1–1)
BUN SERPL-MCNC: 13 MG/DL (ref 6–20)
C DIFF GDH STL QL: NEGATIVE
C DIFF TOX A+B STL QL IA: NEGATIVE
CALCIUM SERPL-MCNC: 8.9 MG/DL (ref 8.7–10.5)
CHLORIDE SERPL-SCNC: 108 MMOL/L (ref 95–110)
CO2 SERPL-SCNC: 26 MMOL/L (ref 23–29)
CREAT SERPL-MCNC: 0.9 MG/DL (ref 0.5–1.4)
DIFFERENTIAL METHOD BLD: ABNORMAL
EOSINOPHIL # BLD AUTO: 0 K/UL (ref 0–0.5)
EOSINOPHIL NFR BLD: 0.4 % (ref 0–8)
ERYTHROCYTE [DISTWIDTH] IN BLOOD BY AUTOMATED COUNT: 13.8 % (ref 11.5–14.5)
EST. GFR  (NO RACE VARIABLE): >60 ML/MIN/1.73 M^2
ESTIMATED AVG GLUCOSE: 146 MG/DL (ref 68–131)
GLUCOSE SERPL-MCNC: 146 MG/DL (ref 70–110)
HBA1C MFR BLD: 6.7 % (ref 4–5.6)
HCT VFR BLD AUTO: 42.6 % (ref 40–54)
HGB BLD-MCNC: 14 G/DL (ref 14–18)
IMM GRANULOCYTES # BLD AUTO: 0.03 K/UL (ref 0–0.04)
IMM GRANULOCYTES NFR BLD AUTO: 0.4 % (ref 0–0.5)
LYMPHOCYTES # BLD AUTO: 0.7 K/UL (ref 1–4.8)
LYMPHOCYTES NFR BLD: 10.2 % (ref 18–48)
MCH RBC QN AUTO: 27.8 PG (ref 27–31)
MCHC RBC AUTO-ENTMCNC: 32.9 G/DL (ref 32–36)
MCV RBC AUTO: 85 FL (ref 82–98)
MONOCYTES # BLD AUTO: 0.5 K/UL (ref 0.3–1)
MONOCYTES NFR BLD: 6.6 % (ref 4–15)
NEUTROPHILS # BLD AUTO: 5.7 K/UL (ref 1.8–7.7)
NEUTROPHILS NFR BLD: 81.7 % (ref 38–73)
NRBC BLD-RTO: 0 /100 WBC
PLATELET # BLD AUTO: 281 K/UL (ref 150–450)
PMV BLD AUTO: 10.3 FL (ref 9.2–12.9)
POCT GLUCOSE: 113 MG/DL (ref 70–110)
POCT GLUCOSE: 124 MG/DL (ref 70–110)
POCT GLUCOSE: 128 MG/DL (ref 70–110)
POCT GLUCOSE: 140 MG/DL (ref 70–110)
POCT GLUCOSE: 150 MG/DL (ref 70–110)
POTASSIUM SERPL-SCNC: 3.9 MMOL/L (ref 3.5–5.1)
PROT SERPL-MCNC: 6.9 G/DL (ref 6–8.4)
RBC # BLD AUTO: 5.03 M/UL (ref 4.6–6.2)
SODIUM SERPL-SCNC: 141 MMOL/L (ref 136–145)
WBC # BLD AUTO: 6.99 K/UL (ref 3.9–12.7)
WBC #/AREA STL HPF: NORMAL /[HPF]

## 2024-11-07 PROCEDURE — 87046 STOOL CULTR AEROBIC BACT EA: CPT | Performed by: NURSE PRACTITIONER

## 2024-11-07 PROCEDURE — 80053 COMPREHEN METABOLIC PANEL: CPT | Performed by: NURSE PRACTITIONER

## 2024-11-07 PROCEDURE — 36415 COLL VENOUS BLD VENIPUNCTURE: CPT | Performed by: NURSE PRACTITIONER

## 2024-11-07 PROCEDURE — 96366 THER/PROPH/DIAG IV INF ADDON: CPT

## 2024-11-07 PROCEDURE — 25000003 PHARM REV CODE 250: Performed by: NURSE PRACTITIONER

## 2024-11-07 PROCEDURE — 87425 ROTAVIRUS AG IA: CPT | Performed by: NURSE PRACTITIONER

## 2024-11-07 PROCEDURE — 96375 TX/PRO/DX INJ NEW DRUG ADDON: CPT

## 2024-11-07 PROCEDURE — 63600175 PHARM REV CODE 636 W HCPCS: Performed by: PHYSICIAN ASSISTANT

## 2024-11-07 PROCEDURE — G0378 HOSPITAL OBSERVATION PER HR: HCPCS

## 2024-11-07 PROCEDURE — 87045 FECES CULTURE AEROBIC BACT: CPT | Performed by: NURSE PRACTITIONER

## 2024-11-07 PROCEDURE — 94760 N-INVAS EAR/PLS OXIMETRY 1: CPT

## 2024-11-07 PROCEDURE — 99900035 HC TECH TIME PER 15 MIN (STAT)

## 2024-11-07 PROCEDURE — 85025 COMPLETE CBC W/AUTO DIFF WBC: CPT | Performed by: NURSE PRACTITIONER

## 2024-11-07 PROCEDURE — 89055 LEUKOCYTE ASSESSMENT FECAL: CPT | Performed by: NURSE PRACTITIONER

## 2024-11-07 PROCEDURE — 96361 HYDRATE IV INFUSION ADD-ON: CPT

## 2024-11-07 PROCEDURE — 96376 TX/PRO/DX INJ SAME DRUG ADON: CPT

## 2024-11-07 PROCEDURE — 87209 SMEAR COMPLEX STAIN: CPT | Performed by: NURSE PRACTITIONER

## 2024-11-07 PROCEDURE — 87329 GIARDIA AG IA: CPT | Performed by: NURSE PRACTITIONER

## 2024-11-07 PROCEDURE — 63600175 PHARM REV CODE 636 W HCPCS: Performed by: NURSE PRACTITIONER

## 2024-11-07 PROCEDURE — 99222 1ST HOSP IP/OBS MODERATE 55: CPT | Mod: ,,, | Performed by: PHYSICIAN ASSISTANT

## 2024-11-07 PROCEDURE — 87449 NOS EACH ORGANISM AG IA: CPT | Mod: 91 | Performed by: NURSE PRACTITIONER

## 2024-11-07 PROCEDURE — 87449 NOS EACH ORGANISM AG IA: CPT | Performed by: NURSE PRACTITIONER

## 2024-11-07 PROCEDURE — 87427 SHIGA-LIKE TOXIN AG IA: CPT | Performed by: NURSE PRACTITIONER

## 2024-11-07 RX ORDER — MORPHINE SULFATE 2 MG/ML
2 INJECTION, SOLUTION INTRAMUSCULAR; INTRAVENOUS EVERY 6 HOURS PRN
Status: DISCONTINUED | OUTPATIENT
Start: 2024-11-07 | End: 2024-11-08 | Stop reason: HOSPADM

## 2024-11-07 RX ADMIN — MORPHINE SULFATE 2 MG: 2 INJECTION, SOLUTION INTRAMUSCULAR; INTRAVENOUS at 09:11

## 2024-11-07 RX ADMIN — MORPHINE SULFATE 4 MG: 4 INJECTION INTRAVENOUS at 05:11

## 2024-11-07 RX ADMIN — PIPERACILLIN AND TAZOBACTAM 4.5 G: 4; .5 INJECTION, POWDER, LYOPHILIZED, FOR SOLUTION INTRAVENOUS; PARENTERAL at 05:11

## 2024-11-07 RX ADMIN — SODIUM CHLORIDE: 9 INJECTION, SOLUTION INTRAVENOUS at 06:11

## 2024-11-07 RX ADMIN — PIPERACILLIN AND TAZOBACTAM 4.5 G: 4; .5 INJECTION, POWDER, LYOPHILIZED, FOR SOLUTION INTRAVENOUS; PARENTERAL at 01:11

## 2024-11-07 RX ADMIN — ONDANSETRON 4 MG: 2 INJECTION INTRAMUSCULAR; INTRAVENOUS at 05:11

## 2024-11-07 RX ADMIN — PIPERACILLIN AND TAZOBACTAM 4.5 G: 4; .5 INJECTION, POWDER, LYOPHILIZED, FOR SOLUTION INTRAVENOUS; PARENTERAL at 09:11

## 2024-11-07 NOTE — ASSESSMENT & PLAN NOTE
This patient does have evidence of infective focus  My overall impression is sepsis.  Source: Abdominal  Antibiotics given-   Antibiotics (72h ago, onward)      Start     Stop Route Frequency Ordered    11/07/24 0515  piperacillin-tazobactam (ZOSYN) 4.5 g in D5W 100 mL IVPB (MB+)         11/09/24 0514 IV Every 8 hours (non-standard times) 11/06/24 2158          Latest lactate reviewed-  Recent Labs   Lab 11/06/24  1620   LACTATE 1.2       Organ dysfunction indicated by no organ dysfunction     Fluid challenge received 1 L bolus in the ED     Post- resuscitation assessment No - Post resuscitation assessment not needed       Will Not start Pressors- Levophed for MAP of 65  Source control achieved by: IV zosyn  Elevated HR, fever, leukocytosis     Improving.  Possible discharge this afternoon.

## 2024-11-07 NOTE — ASSESSMENT & PLAN NOTE
Creatine stable for now. BMP reviewed- noted Estimated Creatinine Clearance: 191.6 mL/min (based on SCr of 0.9 mg/dL). according to latest data. Based on current GFR, CKD stage is stage 2 - GFR 60-89.  Monitor UOP and serial BMP and adjust therapy as needed. Renally dose meds. Avoid nephrotoxic medications and procedures.    Continue to monitor

## 2024-11-07 NOTE — NURSING
IVF rate decreased to 100 ML/hr. Instructed patient on clear liquid diet. Diet sprite and ice water given.

## 2024-11-07 NOTE — ASSESSMENT & PLAN NOTE
Recurrent and seen on CTAP.  No perforation present   Leukocytosis, fever, elevated heart rate  Surgery consulted and recommend elective colectomy.    IV zosyn   NPO and surgery attempting clear liquid diet  IV fluids

## 2024-11-07 NOTE — PLAN OF CARE
Problem: Adult Inpatient Plan of Care  Goal: Plan of Care Review  Outcome: Progressing  Goal: Patient-Specific Goal (Individualized)  Outcome: Progressing  Goal: Absence of Hospital-Acquired Illness or Injury  Outcome: Progressing  Goal: Optimal Comfort and Wellbeing  Outcome: Progressing  Goal: Readiness for Transition of Care  Outcome: Progressing     Problem: Bariatric Environmental Safety  Goal: Safety Maintained with Care  Outcome: Progressing     Problem: Diabetes Comorbidity  Goal: Blood Glucose Level Within Targeted Range  Outcome: Progressing     Problem: Infection  Goal: Absence of Infection Signs and Symptoms  Outcome: Progressing     Problem: Sepsis/Septic Shock  Goal: Optimal Coping  Outcome: Progressing  Goal: Absence of Bleeding  Outcome: Progressing  Goal: Blood Glucose Level Within Targeted Range  Outcome: Progressing  Goal: Absence of Infection Signs and Symptoms  Outcome: Progressing  Goal: Optimal Nutrition Intake  Outcome: Progressing

## 2024-11-07 NOTE — NURSING
Pt up from ed via w/c oriented pt to room and surroundings call light in reach report from ulysses boss rn plan of care reviewed with pt and pt verbalized understanding npo

## 2024-11-07 NOTE — SUBJECTIVE & OBJECTIVE
Past Medical History:   Diagnosis Date    ADHD (attention deficit hyperactivity disorder)        Past Surgical History:   Procedure Laterality Date    CHOLECYSTECTOMY  06/20/2018    LAPAROSCOPIC CHOLECYSTECTOMY N/A 6/20/2018    Procedure: CHOLECYSTECTOMY, LAPAROSCOPIC;  Surgeon: Luis Bogran-Reyes, MD;  Location: [facility];  Service: General;  Laterality: N/A;       Review of patient's allergies indicates:  No Known Allergies    No current facility-administered medications on file prior to encounter.     Current Outpatient Medications on File Prior to Encounter   Medication Sig    ciprofloxacin HCl (CIPRO) 500 MG tablet Take 1 tablet (500 mg total) by mouth every 12 (twelve) hours. for 10 days    empagliflozin (JARDIANCE) 10 mg tablet Take 1 tablet (10 mg total) by mouth once daily.    HYDROcodone-acetaminophen (NORCO) 7.5-325 mg per tablet Take 1 tablet by mouth every 4 (four) hours as needed for Pain.    metFORMIN (GLUCOPHAGE) 500 MG tablet Take 1 tablet (500 mg total) by mouth 2 (two) times daily with meals. Take 1 tablet once a day for 7 days, then take 1 tablet by mouth twice a day    metroNIDAZOLE (FLAGYL) 500 MG tablet Take 1 tablet (500 mg total) by mouth every 8 (eight) hours. for 10 days     Family History       Problem Relation (Age of Onset)    Allergies Maternal Grandmother    Cancer Maternal Aunt, Paternal Grandfather    Depression Paternal Aunt    Diabetes Mother, Maternal Grandfather    Heart disease Mother, Father    Hypertension Mother, Father, Paternal Grandfather    No Known Problems Sister, Brother, Maternal Uncle, Paternal Uncle    Other Mother, Father, Paternal Grandmother          Tobacco Use    Smoking status: Never     Passive exposure: Past    Smokeless tobacco: Never   Substance and Sexual Activity    Alcohol use: No    Drug use: No    Sexual activity: Never     Review of Systems   Constitutional:  Positive for fever. Negative for chills.   HENT:  Negative for ear discharge and ear pain.     Eyes:  Negative for pain and discharge.   Respiratory:  Negative for cough and shortness of breath.    Cardiovascular:  Negative for chest pain and leg swelling.   Gastrointestinal:  Positive for abdominal pain, diarrhea (some improvement), nausea and vomiting.   Endocrine: Negative for cold intolerance and heat intolerance.   Genitourinary:  Negative for difficulty urinating and dysuria.   Musculoskeletal:  Negative for joint swelling and myalgias.   Skin:  Negative for rash and wound.   Neurological:  Negative for dizziness and headaches.   Psychiatric/Behavioral:  Negative for agitation and confusion.      Objective:     Vital Signs (Most Recent):  Temp: 98.2 °F (36.8 °C) (11/07/24 0746)  Pulse: 79 (11/07/24 0746)  Resp: 20 (11/07/24 0746)  BP: 127/60 (11/07/24 0746)  SpO2: (!) 94 % (11/07/24 0746) Vital Signs (24h Range):  Temp:  [98.2 °F (36.8 °C)-100.4 °F (38 °C)] 98.2 °F (36.8 °C)  Pulse:  [] 79  Resp:  [17-20] 20  SpO2:  [94 %-99 %] 94 %  BP: (127-173)/(60-94) 127/60     Weight: (!) 153.5 kg (338 lb 6.5 oz)  Body mass index is 48.56 kg/m².     Physical Exam  Constitutional:       General: He is not in acute distress.  HENT:      Head: Normocephalic and atraumatic.   Eyes:      General:         Right eye: No discharge.         Left eye: No discharge.   Cardiovascular:      Rate and Rhythm: Normal rate and regular rhythm.   Pulmonary:      Effort: Pulmonary effort is normal.      Breath sounds: Normal breath sounds.   Abdominal:      General: There is no distension.      Palpations: Abdomen is soft.      Tenderness: There is no abdominal tenderness.   Musculoskeletal:         General: No swelling or tenderness.      Cervical back: Neck supple. No tenderness.   Skin:     General: Skin is warm and dry.   Neurological:      General: No focal deficit present.      Mental Status: He is alert and oriented to person, place, and time.   Psychiatric:         Mood and Affect: Mood normal.         Behavior:  "Behavior normal.                Significant Labs: A1C:   Recent Labs   Lab 07/02/24  0404 10/30/24  0716 11/06/24  1620   HGBA1C 9.3* 6.8* 6.7*     ABGs: No results for input(s): "PH", "PCO2", "HCO3", "POCSATURATED", "BE", "TOTALHB", "COHB", "METHB", "O2HB", "POCFIO2", "PO2" in the last 48 hours.  Bilirubin:   Recent Labs   Lab 10/30/24  0716 11/06/24  1620 11/07/24  0430   BILITOT 0.5 0.9 1.0     Blood Culture:   Recent Labs   Lab 11/06/24  1620 11/06/24  1622   LABBLOO No Growth to date No Growth to date     BMP:   Recent Labs   Lab 11/07/24  0430   *      K 3.9      CO2 26   BUN 13   CREATININE 0.9   CALCIUM 8.9     CBC:   Recent Labs   Lab 11/06/24  1620 11/07/24  0430   WBC 13.04* 6.99   HGB 15.2 14.0   HCT 45.6 42.6    281     CMP:   Recent Labs   Lab 11/06/24  1620 11/07/24  0430    141   K 4.2 3.9    108   CO2 26 26   * 146*   BUN 14 13   CREATININE 0.9 0.9   CALCIUM 9.7 8.9   PROT 8.1 6.9   ALBUMIN 4.2 3.6   BILITOT 0.9 1.0   ALKPHOS 86 66   AST 36 33   ALT 60* 53*   ANIONGAP 10 7*     Cardiac Markers: No results for input(s): "CKMB", "MYOGLOBIN", "BNP", "TROPISTAT" in the last 48 hours.  Coagulation: No results for input(s): "PT", "INR", "APTT" in the last 48 hours.  Lactic Acid:   Recent Labs   Lab 11/06/24  1620   LACTATE 1.2     Lipase:   Recent Labs   Lab 11/06/24  1620   LIPASE 10     Lipid Panel: No results for input(s): "CHOL", "HDL", "LDLCALC", "TRIG", "CHOLHDL" in the last 48 hours.  Magnesium: No results for input(s): "MG" in the last 48 hours.  POCT Glucose:   Recent Labs   Lab 11/07/24  0027 11/07/24  0629   POCTGLUCOSE 124* 150*     Prealbumin: No results for input(s): "PREALBUMIN" in the last 48 hours.  Respiratory Culture: No results for input(s): "GSRESP", "RESPIRATORYC" in the last 48 hours.  Troponin: No results for input(s): "TROPONINI", "TROPONINIHS" in the last 48 hours.  TSH: No results for input(s): "TSH" in the last 4320 hours.  Urine " "Culture: No results for input(s): "LABURIN" in the last 48 hours.  Urine Studies:   Recent Labs   Lab 11/06/24  1746   COLORU Yellow   APPEARANCEUA Clear   PHUR 5.0   SPECGRAV 1.025   PROTEINUA Trace*   GLUCUA 2+*   KETONESU Trace*   BILIRUBINUA Negative   OCCULTUA Negative   NITRITE Negative   UROBILINOGEN Negative   LEUKOCYTESUR Negative       Significant Imaging: I have reviewed all pertinent imaging results/findings within the past 24 hours.  "

## 2024-11-07 NOTE — PLAN OF CARE
Problem: Adult Inpatient Plan of Care  Goal: Plan of Care Review  Outcome: Progressing     Problem: Adult Inpatient Plan of Care  Goal: Patient-Specific Goal (Individualized)  Outcome: Progressing     Problem: Adult Inpatient Plan of Care  Goal: Optimal Comfort and Wellbeing  Outcome: Progressing     Problem: Diabetes Comorbidity  Goal: Blood Glucose Level Within Targeted Range  Outcome: Progressing     Problem: Infection  Goal: Absence of Infection Signs and Symptoms  Outcome: Progressing

## 2024-11-07 NOTE — H&P
[facility] ([address] [place] [address] Medicine  History & Physical    Patient Name: Greg Wynn  MRN: [id number]  Patient Class: OP- Observation  Admission Date: 11/6/2024  Attending Physician: Lowell Bowen MD   Primary Care Provider: Jyoti Freire NP         Patient information was obtained from patient and ER records.     Subjective:     Principal Problem:<principal problem not specified>    Chief Complaint:   Chief Complaint   Patient presents with    Abdominal Pain     Pt arrives to the ed with c/o abd pain x 2 weeks, nausea, and vomiting x today. Hx diverticulitis. Seen pcp and was put on anx, but pain is still there. Pt reports kids have stomach bug at this time.         HPI: Patient is a 24 year old male with medical history of obesity, fatty liver disease and diverticulitis who presented to the ED with sharp left lower quadrant abdominal pain.  Symptoms initially intermittent but became persistent.  He saw his PCP and she prescribed cipro and flagyl.  Patient has had fever, nausea and vomiting.     Admitted for sepsis secondary to diverticulitis.               Past Medical History:   Diagnosis Date    ADHD (attention deficit hyperactivity disorder)        Past Surgical History:   Procedure Laterality Date    CHOLECYSTECTOMY  06/20/2018    LAPAROSCOPIC CHOLECYSTECTOMY N/A 6/20/2018    Procedure: CHOLECYSTECTOMY, LAPAROSCOPIC;  Surgeon: Luis Bogran-Reyes, MD;  Location: [facility];  Service: General;  Laterality: N/A;       Review of patient's allergies indicates:  No Known Allergies    No current facility-administered medications on file prior to encounter.     Current Outpatient Medications on File Prior to Encounter   Medication Sig    ciprofloxacin HCl (CIPRO) 500 MG tablet Take 1 tablet (500 mg total) by mouth every 12 (twelve) hours. for 10 days    empagliflozin (JARDIANCE) 10 mg tablet Take 1 tablet (10 mg total) by mouth once daily.    HYDROcodone-acetaminophen (NORCO) 7.5-325 mg per  tablet Take 1 tablet by mouth every 4 (four) hours as needed for Pain.    metFORMIN (GLUCOPHAGE) 500 MG tablet Take 1 tablet (500 mg total) by mouth 2 (two) times daily with meals. Take 1 tablet once a day for 7 days, then take 1 tablet by mouth twice a day    metroNIDAZOLE (FLAGYL) 500 MG tablet Take 1 tablet (500 mg total) by mouth every 8 (eight) hours. for 10 days     Family History       Problem Relation (Age of Onset)    Allergies Maternal Grandmother    Cancer Maternal Aunt, Paternal Grandfather    Depression Paternal Aunt    Diabetes Mother, Maternal Grandfather    Heart disease Mother, Father    Hypertension Mother, Father, Paternal Grandfather    No Known Problems Sister, Brother, Maternal Uncle, Paternal Uncle    Other Mother, Father, Paternal Grandmother          Tobacco Use    Smoking status: Never     Passive exposure: Past    Smokeless tobacco: Never   Substance and Sexual Activity    Alcohol use: No    Drug use: No    Sexual activity: Never     Review of Systems   Constitutional:  Positive for fever. Negative for chills.   HENT:  Negative for ear discharge and ear pain.    Eyes:  Negative for pain and discharge.   Respiratory:  Negative for cough and shortness of breath.    Cardiovascular:  Negative for chest pain and leg swelling.   Gastrointestinal:  Positive for abdominal pain, diarrhea (some improvement), nausea and vomiting.   Endocrine: Negative for cold intolerance and heat intolerance.   Genitourinary:  Negative for difficulty urinating and dysuria.   Musculoskeletal:  Negative for joint swelling and myalgias.   Skin:  Negative for rash and wound.   Neurological:  Negative for dizziness and headaches.   Psychiatric/Behavioral:  Negative for agitation and confusion.      Objective:     Vital Signs (Most Recent):  Temp: 98.2 °F (36.8 °C) (11/07/24 0746)  Pulse: 79 (11/07/24 0746)  Resp: 20 (11/07/24 0746)  BP: 127/60 (11/07/24 0746)  SpO2: (!) 94 % (11/07/24 0746) Vital Signs (24h  "Range):  Temp:  [98.2 °F (36.8 °C)-100.4 °F (38 °C)] 98.2 °F (36.8 °C)  Pulse:  [] 79  Resp:  [17-20] 20  SpO2:  [94 %-99 %] 94 %  BP: (127-173)/(60-94) 127/60     Weight: (!) 153.5 kg (338 lb 6.5 oz)  Body mass index is 48.56 kg/m².     Physical Exam  Constitutional:       General: He is not in acute distress.  HENT:      Head: Normocephalic and atraumatic.   Eyes:      General:         Right eye: No discharge.         Left eye: No discharge.   Cardiovascular:      Rate and Rhythm: Normal rate and regular rhythm.   Pulmonary:      Effort: Pulmonary effort is normal.      Breath sounds: Normal breath sounds.   Abdominal:      General: There is no distension.      Palpations: Abdomen is soft.      Tenderness: There is no abdominal tenderness.   Musculoskeletal:         General: No swelling or tenderness.      Cervical back: Neck supple. No tenderness.   Skin:     General: Skin is warm and dry.   Neurological:      General: No focal deficit present.      Mental Status: He is alert and oriented to person, place, and time.   Psychiatric:         Mood and Affect: Mood normal.         Behavior: Behavior normal.                Significant Labs: A1C:   Recent Labs   Lab 07/02/24  0404 10/30/24  0716 11/06/24  1620   HGBA1C 9.3* 6.8* 6.7*     ABGs: No results for input(s): "PH", "PCO2", "HCO3", "POCSATURATED", "BE", "TOTALHB", "COHB", "METHB", "O2HB", "POCFIO2", "PO2" in the last 48 hours.  Bilirubin:   Recent Labs   Lab 10/30/24  0716 11/06/24  1620 11/07/24  0430   BILITOT 0.5 0.9 1.0     Blood Culture:   Recent Labs   Lab 11/06/24  1620 11/06/24  1622   LABBLOO No Growth to date No Growth to date     BMP:   Recent Labs   Lab 11/07/24  0430   *      K 3.9      CO2 26   BUN 13   CREATININE 0.9   CALCIUM 8.9     CBC:   Recent Labs   Lab 11/06/24  1620 11/07/24  0430   WBC 13.04* 6.99   HGB 15.2 14.0   HCT 45.6 42.6    281     CMP:   Recent Labs   Lab 11/06/24  1620 11/07/24  0430    141 " "  K 4.2 3.9    108   CO2 26 26   * 146*   BUN 14 13   CREATININE 0.9 0.9   CALCIUM 9.7 8.9   PROT 8.1 6.9   ALBUMIN 4.2 3.6   BILITOT 0.9 1.0   ALKPHOS 86 66   AST 36 33   ALT 60* 53*   ANIONGAP 10 7*     Cardiac Markers: No results for input(s): "CKMB", "MYOGLOBIN", "BNP", "TROPISTAT" in the last 48 hours.  Coagulation: No results for input(s): "PT", "INR", "APTT" in the last 48 hours.  Lactic Acid:   Recent Labs   Lab 11/06/24  1620   LACTATE 1.2     Lipase:   Recent Labs   Lab 11/06/24  1620   LIPASE 10     Lipid Panel: No results for input(s): "CHOL", "HDL", "LDLCALC", "TRIG", "CHOLHDL" in the last 48 hours.  Magnesium: No results for input(s): "MG" in the last 48 hours.  POCT Glucose:   Recent Labs   Lab 11/07/24  0027 11/07/24  0629   POCTGLUCOSE 124* 150*     Prealbumin: No results for input(s): "PREALBUMIN" in the last 48 hours.  Respiratory Culture: No results for input(s): "GSRESP", "RESPIRATORYC" in the last 48 hours.  Troponin: No results for input(s): "TROPONINI", "TROPONINIHS" in the last 48 hours.  TSH: No results for input(s): "TSH" in the last 4320 hours.  Urine Culture: No results for input(s): "LABURIN" in the last 48 hours.  Urine Studies:   Recent Labs   Lab 11/06/24  1746   COLORU Yellow   APPEARANCEUA Clear   PHUR 5.0   SPECGRAV 1.025   PROTEINUA Trace*   GLUCUA 2+*   KETONESU Trace*   BILIRUBINUA Negative   OCCULTUA Negative   NITRITE Negative   UROBILINOGEN Negative   LEUKOCYTESUR Negative       Significant Imaging: I have reviewed all pertinent imaging results/findings within the past 24 hours.  Assessment/Plan:   *Diverticulitis of sigmoid colon  Recurrent and seen on CTAP.  No perforation present   Leukocytosis, fever, elevated heart rate  Surgery consulted and recommend elective colectomy.    IV zosyn   NPO and surgery attempting clear liquid diet  IV fluids       Type 2 diabetes mellitus, without long-term current use of insulin  S/s insulin placed       CKD stage 2 due to " type 2 diabetes mellitus  Creatine stable for now. BMP reviewed- noted Estimated Creatinine Clearance: 191.6 mL/min (based on SCr of 0.9 mg/dL). according to latest data. Based on current GFR, CKD stage is stage 2 - GFR 60-89.  Monitor UOP and serial BMP and adjust therapy as needed. Renally dose meds. Avoid nephrotoxic medications and procedures.    Continue to monitor     Sepsis  This patient does have evidence of infective focus  My overall impression is sepsis.  Source: Abdominal  Antibiotics given-   Antibiotics (72h ago, onward)      Start     Stop Route Frequency Ordered    11/07/24 0515  piperacillin-tazobactam (ZOSYN) 4.5 g in D5W 100 mL IVPB (MB+)         11/09/24 0514 IV Every 8 hours (non-standard times) 11/06/24 2158          Latest lactate reviewed-  Recent Labs   Lab 11/06/24  1620   LACTATE 1.2       Organ dysfunction indicated by no organ dysfunction     Fluid challenge received 1 L bolus in the ED     Post- resuscitation assessment No - Post resuscitation assessment not needed       Will Not start Pressors- Levophed for MAP of 65  Source control achieved by: IV zosyn  Elevated HR, fever, leukocytosis       VTE Risk Mitigation (From admission, onward)           Ordered     IP VTE HIGH RISK PATIENT  Once         11/06/24 2158     Place sequential compression device  Until discontinued         11/06/24 2158                         On 11/07/2024, patient should be placed in hospital observation services under my care in collaboration with Dr. Mayorga.           Ashley Nava PA-C  Department of Hospital Medicine  [place] [facility] ([facility])

## 2024-11-07 NOTE — PLAN OF CARE
11/07/24 0858   Rounds   Attendance Provider;Nurse ;   Discharge Plan A Home   Why the patient remains in the hospital Requires continued medical care   Transition of Care Barriers None     Care team at bedside, discussed plan of care with patient.  Discharge planning initiated. Patient provided with Case Management contact information and encouraged to call with concerns or questions. Discharge Planning Begins on Admission Pamphlet provided.  Will continue to follow for duration of stay.

## 2024-11-07 NOTE — CONSULTS
[place] [facility] (3rd Fl)  General Surgery  Consult Note    Inpatient consult to General Surgery  Consult performed by: Darren Gomez MD  Consult ordered by: Zayra Lu NP  Reason for consult: Diverticulitis  Assessment/Recommendations: Patient is here with 2nd attack, first in July where he stayed for 3 days and surgery was mentioned as a possibility. Currently doing well since admit, decreased abdominal pain, had a BM, minimal nausea this AM. Will try clears today and if tolerated may be able to change to oral abx. Patient now with 2 hospitalized episodes of divertculitis in 6 months so recommendation would be for elective colectomy.         Subjective:     Chief Complaint/Reason for Admission: Diverticulitis    History of Present Illness: 24 year old male with LLQ pain here with another bout of diverticulitis. Patient feels good today, less pain, minimal nausea.     No current facility-administered medications on file prior to encounter.     Current Outpatient Medications on File Prior to Encounter   Medication Sig    ciprofloxacin HCl (CIPRO) 500 MG tablet Take 1 tablet (500 mg total) by mouth every 12 (twelve) hours. for 10 days    empagliflozin (JARDIANCE) 10 mg tablet Take 1 tablet (10 mg total) by mouth once daily.    HYDROcodone-acetaminophen (NORCO) 7.5-325 mg per tablet Take 1 tablet by mouth every 4 (four) hours as needed for Pain.    metFORMIN (GLUCOPHAGE) 500 MG tablet Take 1 tablet (500 mg total) by mouth 2 (two) times daily with meals. Take 1 tablet once a day for 7 days, then take 1 tablet by mouth twice a day    metroNIDAZOLE (FLAGYL) 500 MG tablet Take 1 tablet (500 mg total) by mouth every 8 (eight) hours. for 10 days       Review of patient's allergies indicates:  No Known Allergies    Past Medical History:   Diagnosis Date    ADHD (attention deficit hyperactivity disorder)      Past Surgical History:   Procedure Laterality Date    CHOLECYSTECTOMY  06/20/2018    LAPAROSCOPIC  CHOLECYSTECTOMY N/A 6/20/2018    Procedure: CHOLECYSTECTOMY, LAPAROSCOPIC;  Surgeon: Luis Bogran-Reyes, MD;  Location: [facility];  Service: General;  Laterality: N/A;     Family History       Problem Relation (Age of Onset)    Allergies Maternal Grandmother    Cancer Maternal Aunt, Paternal Grandfather    Depression Paternal Aunt    Diabetes Mother, Maternal Grandfather    Heart disease Mother, Father    Hypertension Mother, Father, Paternal Grandfather    No Known Problems Sister, Brother, Maternal Uncle, Paternal Uncle    Other Mother, Father, Paternal Grandmother          Tobacco Use    Smoking status: Never     Passive exposure: Past    Smokeless tobacco: Never   Substance and Sexual Activity    Alcohol use: No    Drug use: No    Sexual activity: Never     Review of Systems   Constitutional: Negative.    Respiratory: Negative.     Cardiovascular: Negative.    Gastrointestinal:  Positive for abdominal distention, abdominal pain and nausea. Negative for constipation.   Skin: Negative.    All other systems reviewed and are negative.    Objective:     Vital Signs (Most Recent):  Temp: 98.2 °F (36.8 °C) (11/07/24 0746)  Pulse: 79 (11/07/24 0746)  Resp: 20 (11/07/24 0746)  BP: 127/60 (11/07/24 0746)  SpO2: (!) 94 % (11/07/24 0746) Vital Signs (24h Range):  Temp:  [98.2 °F (36.8 °C)-100.4 °F (38 °C)] 98.2 °F (36.8 °C)  Pulse:  [] 79  Resp:  [17-20] 20  SpO2:  [94 %-99 %] 94 %  BP: (127-173)/(60-94) 127/60     Weight: (!) 153.5 kg (338 lb 6.5 oz)  Body mass index is 48.56 kg/m².      Intake/Output Summary (Last 24 hours) at 11/7/2024 0833  Last data filed at 11/7/2024 0638  Gross per 24 hour   Intake 1491.56 ml   Output 1350 ml   Net 141.56 ml       Physical Exam  Vitals and nursing note reviewed.   Constitutional:       Appearance: Normal appearance. He is well-developed.   Cardiovascular:      Rate and Rhythm: Normal rate and regular rhythm.   Pulmonary:      Effort: Pulmonary effort is normal.      Breath sounds:  Normal breath sounds.   Abdominal:      General: Bowel sounds are normal. There is distension.      Palpations: Abdomen is soft.      Tenderness: There is abdominal tenderness (LLQ mild).   Musculoskeletal:      Cervical back: Normal range of motion and neck supple.   Skin:     General: Skin is warm and dry.         Significant Labs:  BMP:   Recent Labs   Lab 11/07/24  0430   *      K 3.9      CO2 26   BUN 13   CREATININE 0.9   CALCIUM 8.9     CBC:   Recent Labs   Lab 11/07/24  0430   WBC 6.99   RBC 5.03   HGB 14.0   HCT 42.6      MCV 85   MCH 27.8   MCHC 32.9       Significant Diagnostics:  I have reviewed all pertinent imaging results/findings within the past 24 hours.    Assessment/Plan:     There are no hospital problems to display for this patient.      Thank you for your consult. I will follow-up with patient. Please contact us if you have any additional questions.    Darren Gomez MD  General Surgery  [place] [facility]urg (3rd Fl)

## 2024-11-07 NOTE — ED NOTES
Pt transported upstairs via wheelchair by RN. All personal belongings including phone, wallet, and key brought upstairs with pt. PARTH YBARRA. Report given to FABIOLA Pineda.

## 2024-11-07 NOTE — HPI
Patient is a 24 year old male with medical history of obesity, fatty liver disease and diverticulitis who presented to the ED with sharp left lower quadrant abdominal pain.  Symptoms initially intermittent but became persistent.  He saw his PCP and she prescribed cipro and flagyl.  Patient has had fever, nausea and vomiting.     Admitted for sepsis secondary to diverticulitis.

## 2024-11-07 NOTE — PLAN OF CARE
[place] [facility] (3rd Fl)  Discharge Assessment    Primary Care Provider: Jyoti Freire NP     Discharge Assessment (most recent)       BRIEF DISCHARGE ASSESSMENT - 11/07/24 0914          Discharge Planning    Assessment Type Discharge Planning Brief Assessment (P)      Resource/Environmental Concerns none (P)      Support Systems Parent;Family members;Friends/neighbors (P)      Assistance Needed none (P)      Equipment Currently Used at Home none (P)      Current Living Arrangements home (P)      Patient/Family Anticipates Transition to home (P)      Patient/Family Anticipated Services at Transition none (P)      DME Needed Upon Discharge  none (P)      Discharge Plan A Home (P)      Discharge Plan B Home with family (P)                        Discharge assessment completed. No post acute needs determined at this time. CM to remain available if needs arise.

## 2024-11-07 NOTE — ASSESSMENT & PLAN NOTE
This patient does have evidence of infective focus  My overall impression is sepsis.  Source: Abdominal  Antibiotics given-   Antibiotics (72h ago, onward)      Start     Stop Route Frequency Ordered    11/07/24 0515  piperacillin-tazobactam (ZOSYN) 4.5 g in D5W 100 mL IVPB (MB+)         11/09/24 0514 IV Every 8 hours (non-standard times) 11/06/24 2158          Latest lactate reviewed-  Recent Labs   Lab 11/06/24  1620   LACTATE 1.2     Organ dysfunction indicated by no organ dysfunction     Fluid challenge received 1 L bolus in the ED     Post- resuscitation assessment No - Post resuscitation assessment not needed       Will Not start Pressors- Levophed for MAP of 65  Source control achieved by: IV zosyn  Elevated HR, fever, leukocytosis

## 2024-11-08 VITALS
HEIGHT: 70 IN | SYSTOLIC BLOOD PRESSURE: 138 MMHG | TEMPERATURE: 98 F | HEART RATE: 84 BPM | BODY MASS INDEX: 45.1 KG/M2 | DIASTOLIC BLOOD PRESSURE: 68 MMHG | OXYGEN SATURATION: 98 % | RESPIRATION RATE: 20 BRPM | WEIGHT: 315 LBS

## 2024-11-08 PROBLEM — K57.32 DIVERTICULITIS OF LARGE INTESTINE WITHOUT PERFORATION OR ABSCESS WITHOUT BLEEDING: Status: ACTIVE | Noted: 2024-11-08

## 2024-11-08 LAB
ANION GAP SERPL CALC-SCNC: 7 MMOL/L (ref 8–16)
BUN SERPL-MCNC: 10 MG/DL (ref 6–20)
CALCIUM SERPL-MCNC: 8.9 MG/DL (ref 8.7–10.5)
CHLORIDE SERPL-SCNC: 105 MMOL/L (ref 95–110)
CO2 SERPL-SCNC: 26 MMOL/L (ref 23–29)
CREAT SERPL-MCNC: 0.8 MG/DL (ref 0.5–1.4)
CRYPTOSP AG STL QL IA: NEGATIVE
E COLI SXT1 STL QL IA: NEGATIVE
E COLI SXT2 STL QL IA: NEGATIVE
EST. GFR  (NO RACE VARIABLE): >60 ML/MIN/1.73 M^2
G LAMBLIA AG STL QL IA: NEGATIVE
GLUCOSE SERPL-MCNC: 161 MG/DL (ref 70–110)
MAGNESIUM SERPL-MCNC: 1.9 MG/DL (ref 1.6–2.6)
POCT GLUCOSE: 123 MG/DL (ref 70–110)
POCT GLUCOSE: 142 MG/DL (ref 70–110)
POTASSIUM SERPL-SCNC: 3.8 MMOL/L (ref 3.5–5.1)
RV AG STL QL IA.RAPID: NEGATIVE
SODIUM SERPL-SCNC: 138 MMOL/L (ref 136–145)

## 2024-11-08 PROCEDURE — 25000003 PHARM REV CODE 250: Performed by: NURSE PRACTITIONER

## 2024-11-08 PROCEDURE — 36415 COLL VENOUS BLD VENIPUNCTURE: CPT | Performed by: PHYSICIAN ASSISTANT

## 2024-11-08 PROCEDURE — 63600175 PHARM REV CODE 636 W HCPCS: Performed by: NURSE PRACTITIONER

## 2024-11-08 PROCEDURE — 80048 BASIC METABOLIC PNL TOTAL CA: CPT | Performed by: PHYSICIAN ASSISTANT

## 2024-11-08 PROCEDURE — 96366 THER/PROPH/DIAG IV INF ADDON: CPT

## 2024-11-08 PROCEDURE — 83735 ASSAY OF MAGNESIUM: CPT | Performed by: PHYSICIAN ASSISTANT

## 2024-11-08 PROCEDURE — 94760 N-INVAS EAR/PLS OXIMETRY 1: CPT

## 2024-11-08 PROCEDURE — G0378 HOSPITAL OBSERVATION PER HR: HCPCS

## 2024-11-08 PROCEDURE — 99238 HOSP IP/OBS DSCHRG MGMT 30/<: CPT | Mod: ,,, | Performed by: PHYSICIAN ASSISTANT

## 2024-11-08 RX ORDER — AMOXICILLIN AND CLAVULANATE POTASSIUM 875; 125 MG/1; MG/1
1 TABLET, FILM COATED ORAL EVERY 12 HOURS
Qty: 14 TABLET | Refills: 0 | Status: SHIPPED | OUTPATIENT
Start: 2024-11-08 | End: 2024-11-09

## 2024-11-08 RX ORDER — AMOXICILLIN AND CLAVULANATE POTASSIUM 875; 125 MG/1; MG/1
1 TABLET, FILM COATED ORAL EVERY 12 HOURS
Qty: 10 TABLET | Refills: 0 | Status: SHIPPED | OUTPATIENT
Start: 2024-11-08 | End: 2024-11-08

## 2024-11-08 RX ADMIN — PIPERACILLIN AND TAZOBACTAM 4.5 G: 4; .5 INJECTION, POWDER, LYOPHILIZED, FOR SOLUTION INTRAVENOUS; PARENTERAL at 06:11

## 2024-11-08 NOTE — PLAN OF CARE
Pt progessing, stable, and free of falls. Pt AAO x 4 with no distress noted on day of care. Bed locked and in the lowest position with call bell within reach.     Problem: Adult Inpatient Plan of Care  Goal: Plan of Care Review  Outcome: Adequate for Care Transition  Flowsheets (Taken 11/8/2024 1411)  Plan of Care Reviewed With:   patient   spouse  Goal: Patient-Specific Goal (Individualized)  Outcome: Adequate for Care Transition  Goal: Absence of Hospital-Acquired Illness or Injury  Outcome: Adequate for Care Transition  Goal: Optimal Comfort and Wellbeing  Outcome: Adequate for Care Transition  Goal: Readiness for Transition of Care  Outcome: Adequate for Care Transition     Problem: Bariatric Environmental Safety  Goal: Safety Maintained with Care  Outcome: Adequate for Care Transition     Problem: Diabetes Comorbidity  Goal: Blood Glucose Level Within Targeted Range  Outcome: Adequate for Care Transition     Problem: Infection  Goal: Absence of Infection Signs and Symptoms  Outcome: Adequate for Care Transition     Problem: Sepsis/Septic Shock  Goal: Optimal Coping  Outcome: Adequate for Care Transition  Goal: Absence of Bleeding  Outcome: Adequate for Care Transition  Goal: Blood Glucose Level Within Targeted Range  Outcome: Adequate for Care Transition  Goal: Absence of Infection Signs and Symptoms  Outcome: Adequate for Care Transition  Goal: Optimal Nutrition Intake  Outcome: Adequate for Care Transition

## 2024-11-08 NOTE — PROGRESS NOTES
[facility] ([address] [place] [address] Medicine  Progress Note    Patient Name: Greg Wynn  MRN: [id number]  Patient Class: OP- Observation   Admission Date: 11/6/2024  Length of Stay: 0 days  Attending Physician: Lowell Bowen MD  Primary Care Provider: Jyoti Freire NP        Subjective:     Principal Problem:<principal problem not specified>        HPI:  Patient is a 24 year old male with medical history of obesity, fatty liver disease and diverticulitis who presented to the ED with sharp left lower quadrant abdominal pain.  Symptoms initially intermittent but became persistent.  He saw his PCP and she prescribed cipro and flagyl.  Patient has had fever, nausea and vomiting.     Admitted for sepsis secondary to diverticulitis.               Overview/Hospital Course:  PT HD stable on room air.  Repeat labs pending.  Will advance diet to bland.  Possible discharge this afternoon if able to tolerate.      Past Medical History:   Diagnosis Date    ADHD (attention deficit hyperactivity disorder)        Past Surgical History:   Procedure Laterality Date    CHOLECYSTECTOMY  06/20/2018    LAPAROSCOPIC CHOLECYSTECTOMY N/A 6/20/2018    Procedure: CHOLECYSTECTOMY, LAPAROSCOPIC;  Surgeon: Luis Bogran-Reyes, MD;  Location: [facility];  Service: General;  Laterality: N/A;       Review of patient's allergies indicates:  No Known Allergies    No current facility-administered medications on file prior to encounter.     Current Outpatient Medications on File Prior to Encounter   Medication Sig    ciprofloxacin HCl (CIPRO) 500 MG tablet Take 1 tablet (500 mg total) by mouth every 12 (twelve) hours. for 10 days    empagliflozin (JARDIANCE) 10 mg tablet Take 1 tablet (10 mg total) by mouth once daily.    HYDROcodone-acetaminophen (NORCO) 7.5-325 mg per tablet Take 1 tablet by mouth every 4 (four) hours as needed for Pain.    metFORMIN (GLUCOPHAGE) 500 MG tablet Take 1 tablet (500 mg total) by mouth 2 (two) times daily  with meals. Take 1 tablet once a day for 7 days, then take 1 tablet by mouth twice a day    metroNIDAZOLE (FLAGYL) 500 MG tablet Take 1 tablet (500 mg total) by mouth every 8 (eight) hours. for 10 days     Family History       Problem Relation (Age of Onset)    Allergies Maternal Grandmother    Cancer Maternal Aunt, Paternal Grandfather    Depression Paternal Aunt    Diabetes Mother, Maternal Grandfather    Heart disease Mother, Father    Hypertension Mother, Father, Paternal Grandfather    No Known Problems Sister, Brother, Maternal Uncle, Paternal Uncle    Other Mother, Father, Paternal Grandmother          Tobacco Use    Smoking status: Never     Passive exposure: Past    Smokeless tobacco: Never   Substance and Sexual Activity    Alcohol use: No    Drug use: No    Sexual activity: Never     Review of Systems   Constitutional:  Negative for chills and fever.   HENT:  Negative for ear discharge and ear pain.    Eyes:  Negative for pain and discharge.   Respiratory:  Negative for cough and shortness of breath.    Cardiovascular:  Negative for chest pain and leg swelling.   Gastrointestinal:  Positive for diarrhea (improving). Negative for abdominal pain, nausea and vomiting.   Endocrine: Negative for cold intolerance and heat intolerance.   Genitourinary:  Negative for difficulty urinating and dysuria.   Musculoskeletal:  Negative for joint swelling and myalgias.   Skin:  Negative for rash and wound.   Neurological:  Negative for dizziness and headaches.   Psychiatric/Behavioral:  Negative for agitation and confusion.      Objective:     Vital Signs (Most Recent):  Temp: 97.4 °F (36.3 °C) (11/08/24 0806)  Pulse: 66 (11/08/24 0806)  Resp: 18 (11/08/24 0806)  BP: 136/74 (11/08/24 0806)  SpO2: 99 % (11/08/24 0806) Vital Signs (24h Range):  Temp:  [97.4 °F (36.3 °C)-98 °F (36.7 °C)] 97.4 °F (36.3 °C)  Pulse:  [63-66] 66  Resp:  [16-20] 18  SpO2:  [96 %-99 %] 99 %  BP: (119-144)/(58-88) 136/74     Weight: (!) 153.5 kg  "(338 lb 6.5 oz)  Body mass index is 48.56 kg/m².     Physical Exam  Constitutional:       General: He is not in acute distress.  HENT:      Head: Normocephalic and atraumatic.   Eyes:      General:         Right eye: No discharge.         Left eye: No discharge.   Cardiovascular:      Rate and Rhythm: Normal rate and regular rhythm.   Pulmonary:      Effort: Pulmonary effort is normal.      Breath sounds: Normal breath sounds.   Abdominal:      General: There is no distension.      Palpations: Abdomen is soft.      Tenderness: There is no abdominal tenderness.   Musculoskeletal:         General: No swelling or tenderness.      Cervical back: Neck supple. No tenderness.   Skin:     General: Skin is warm and dry.   Neurological:      General: No focal deficit present.      Mental Status: He is alert and oriented to person, place, and time.   Psychiatric:         Mood and Affect: Mood normal.         Behavior: Behavior normal.                Significant Labs: A1C:   Recent Labs   Lab 07/02/24  0404 10/30/24  0716 11/06/24 1620   HGBA1C 9.3* 6.8* 6.7*     ABGs: No results for input(s): "PH", "PCO2", "HCO3", "POCSATURATED", "BE", "TOTALHB", "COHB", "METHB", "O2HB", "POCFIO2", "PO2" in the last 48 hours.  Bilirubin:   Recent Labs   Lab 10/30/24  0716 11/06/24  1620 11/07/24  0430   BILITOT 0.5 0.9 1.0     Blood Culture:   Recent Labs   Lab 11/06/24  1620 11/06/24  1622   LABBLOO No Growth to date  No Growth to date No Growth to date  No Growth to date     BMP:   Recent Labs   Lab 11/07/24  0430   *      K 3.9      CO2 26   BUN 13   CREATININE 0.9   CALCIUM 8.9     CBC:   Recent Labs   Lab 11/06/24  1620 11/07/24  0430   WBC 13.04* 6.99   HGB 15.2 14.0   HCT 45.6 42.6    281     CMP:   Recent Labs   Lab 11/06/24  1620 11/07/24  0430    141   K 4.2 3.9    108   CO2 26 26   * 146*   BUN 14 13   CREATININE 0.9 0.9   CALCIUM 9.7 8.9   PROT 8.1 6.9   ALBUMIN 4.2 3.6   BILITOT 0.9 " "1.0   ALKPHOS 86 66   AST 36 33   ALT 60* 53*   ANIONGAP 10 7*     Cardiac Markers: No results for input(s): "CKMB", "MYOGLOBIN", "BNP", "TROPISTAT" in the last 48 hours.  Coagulation: No results for input(s): "PT", "INR", "APTT" in the last 48 hours.  Lactic Acid:   Recent Labs   Lab 11/06/24  1620   LACTATE 1.2     Lipase:   Recent Labs   Lab 11/06/24  1620   LIPASE 10     Lipid Panel: No results for input(s): "CHOL", "HDL", "LDLCALC", "TRIG", "CHOLHDL" in the last 48 hours.  Magnesium: No results for input(s): "MG" in the last 48 hours.  POCT Glucose:   Recent Labs   Lab 11/07/24  1159 11/07/24  1630 11/07/24  1945   POCTGLUCOSE 128* 140* 113*     Prealbumin: No results for input(s): "PREALBUMIN" in the last 48 hours.  Respiratory Culture: No results for input(s): "GSRESP", "RESPIRATORYC" in the last 48 hours.  Troponin: No results for input(s): "TROPONINI", "TROPONINIHS" in the last 48 hours.  TSH: No results for input(s): "TSH" in the last 4320 hours.  Urine Culture: No results for input(s): "LABURIN" in the last 48 hours.  Urine Studies:   Recent Labs   Lab 11/06/24  1746   COLORU Yellow   APPEARANCEUA Clear   PHUR 5.0   SPECGRAV 1.025   PROTEINUA Trace*   GLUCUA 2+*   KETONESU Trace*   BILIRUBINUA Negative   OCCULTUA Negative   NITRITE Negative   UROBILINOGEN Negative   LEUKOCYTESUR Negative       Significant Imaging: I have reviewed all pertinent imaging results/findings within the past 24 hours.    Assessment/Plan:      Sepsis  This patient does have evidence of infective focus  My overall impression is sepsis.  Source: Abdominal  Antibiotics given-   Antibiotics (72h ago, onward)      Start     Stop Route Frequency Ordered    11/07/24 0515  piperacillin-tazobactam (ZOSYN) 4.5 g in D5W 100 mL IVPB (MB+)         11/09/24 0514 IV Every 8 hours (non-standard times) 11/06/24 2158          Latest lactate reviewed-  Recent Labs   Lab 11/06/24  1620   LACTATE 1.2       Organ dysfunction indicated by no organ " dysfunction     Fluid challenge received 1 L bolus in the ED     Post- resuscitation assessment No - Post resuscitation assessment not needed       Will Not start Pressors- Levophed for MAP of 65  Source control achieved by: IV zosyn  Elevated HR, fever, leukocytosis     Improving.  Possible discharge this afternoon.        Type 2 diabetes mellitus, without long-term current use of insulin  S/s insulin placed       CKD stage 2 due to type 2 diabetes mellitus  Creatine stable for now. BMP reviewed- noted Estimated Creatinine Clearance: 191.6 mL/min (based on SCr of 0.9 mg/dL). according to latest data. Based on current GFR, CKD stage is stage 2 - GFR 60-89.  Monitor UOP and serial BMP and adjust therapy as needed. Renally dose meds. Avoid nephrotoxic medications and procedures.    Continue to monitor / repeat labs pending today     Diverticulitis of sigmoid colon  Recurrent and seen on CTAP.  No perforation present   Leukocytosis, fever, elevated heart rate  Surgery consulted and recommend elective colectomy.    IV zosyn   NPO and surgery attempting clear liquid diet  IV fluids     11/8 Advancing diet to bland/low residue        VTE Risk Mitigation (From admission, onward)           Ordered     IP VTE HIGH RISK PATIENT  Once         11/06/24 2158     Place sequential compression device  Until discontinued         11/06/24 2158                    Discharge Planning   CHERYL:      Code Status: Full Code   Is the patient medically ready for discharge?:     Reason for patient still in hospital (select all that apply): Patient trending condition  Discharge Plan A: Home                  Ashley Nava PA-C  Department of Hospital Medicine   [place] (3rd Fl)

## 2024-11-08 NOTE — HOSPITAL COURSE
PT HD stable on room air.  Repeat labs pending.  Will advance diet to bland.  Possible discharge this afternoon if able to tolerate.      11/8 Pt able to tolerate bland diet.  Will discharge with 7 days of Augmentin for diverticulitis.  F/u outpatient PCP.

## 2024-11-08 NOTE — ASSESSMENT & PLAN NOTE
Recurrent and seen on CTAP.  No perforation present   Leukocytosis, fever, elevated heart rate  Surgery consulted and recommend elective colectomy.    IV zosyn   NPO and surgery attempting clear liquid diet  IV fluids     11/8 Improving and tolerating bland diet.  Discharge with 7 day course of augmentin.

## 2024-11-08 NOTE — SUBJECTIVE & OBJECTIVE
Past Medical History:   Diagnosis Date    ADHD (attention deficit hyperactivity disorder)        Past Surgical History:   Procedure Laterality Date    CHOLECYSTECTOMY  06/20/2018    LAPAROSCOPIC CHOLECYSTECTOMY N/A 6/20/2018    Procedure: CHOLECYSTECTOMY, LAPAROSCOPIC;  Surgeon: Luis Bogran-Reyes, MD;  Location: [facility];  Service: General;  Laterality: N/A;       Review of patient's allergies indicates:  No Known Allergies    No current facility-administered medications on file prior to encounter.     Current Outpatient Medications on File Prior to Encounter   Medication Sig    ciprofloxacin HCl (CIPRO) 500 MG tablet Take 1 tablet (500 mg total) by mouth every 12 (twelve) hours. for 10 days    empagliflozin (JARDIANCE) 10 mg tablet Take 1 tablet (10 mg total) by mouth once daily.    HYDROcodone-acetaminophen (NORCO) 7.5-325 mg per tablet Take 1 tablet by mouth every 4 (four) hours as needed for Pain.    metFORMIN (GLUCOPHAGE) 500 MG tablet Take 1 tablet (500 mg total) by mouth 2 (two) times daily with meals. Take 1 tablet once a day for 7 days, then take 1 tablet by mouth twice a day    metroNIDAZOLE (FLAGYL) 500 MG tablet Take 1 tablet (500 mg total) by mouth every 8 (eight) hours. for 10 days     Family History       Problem Relation (Age of Onset)    Allergies Maternal Grandmother    Cancer Maternal Aunt, Paternal Grandfather    Depression Paternal Aunt    Diabetes Mother, Maternal Grandfather    Heart disease Mother, Father    Hypertension Mother, Father, Paternal Grandfather    No Known Problems Sister, Brother, Maternal Uncle, Paternal Uncle    Other Mother, Father, Paternal Grandmother          Tobacco Use    Smoking status: Never     Passive exposure: Past    Smokeless tobacco: Never   Substance and Sexual Activity    Alcohol use: No    Drug use: No    Sexual activity: Never     Review of Systems   Constitutional:  Negative for chills and fever.   HENT:  Negative for ear discharge and ear pain.    Eyes:   Negative for pain and discharge.   Respiratory:  Negative for cough and shortness of breath.    Cardiovascular:  Negative for chest pain and leg swelling.   Gastrointestinal:  Positive for diarrhea (improving). Negative for abdominal pain, nausea and vomiting.   Endocrine: Negative for cold intolerance and heat intolerance.   Genitourinary:  Negative for difficulty urinating and dysuria.   Musculoskeletal:  Negative for joint swelling and myalgias.   Skin:  Negative for rash and wound.   Neurological:  Negative for dizziness and headaches.   Psychiatric/Behavioral:  Negative for agitation and confusion.      Objective:     Vital Signs (Most Recent):  Temp: 97.4 °F (36.3 °C) (11/08/24 0806)  Pulse: 66 (11/08/24 0806)  Resp: 18 (11/08/24 0806)  BP: 136/74 (11/08/24 0806)  SpO2: 99 % (11/08/24 0806) Vital Signs (24h Range):  Temp:  [97.4 °F (36.3 °C)-98 °F (36.7 °C)] 97.4 °F (36.3 °C)  Pulse:  [63-66] 66  Resp:  [16-20] 18  SpO2:  [96 %-99 %] 99 %  BP: (119-144)/(58-88) 136/74     Weight: (!) 153.5 kg (338 lb 6.5 oz)  Body mass index is 48.56 kg/m².     Physical Exam  Constitutional:       General: He is not in acute distress.  HENT:      Head: Normocephalic and atraumatic.   Eyes:      General:         Right eye: No discharge.         Left eye: No discharge.   Cardiovascular:      Rate and Rhythm: Normal rate and regular rhythm.   Pulmonary:      Effort: Pulmonary effort is normal.      Breath sounds: Normal breath sounds.   Abdominal:      General: There is no distension.      Palpations: Abdomen is soft.      Tenderness: There is no abdominal tenderness.   Musculoskeletal:         General: No swelling or tenderness.      Cervical back: Neck supple. No tenderness.   Skin:     General: Skin is warm and dry.   Neurological:      General: No focal deficit present.      Mental Status: He is alert and oriented to person, place, and time.   Psychiatric:         Mood and Affect: Mood normal.         Behavior: Behavior  "normal.                Significant Labs: A1C:   Recent Labs   Lab 07/02/24  0404 10/30/24  0716 11/06/24  1620   HGBA1C 9.3* 6.8* 6.7*     ABGs: No results for input(s): "PH", "PCO2", "HCO3", "POCSATURATED", "BE", "TOTALHB", "COHB", "METHB", "O2HB", "POCFIO2", "PO2" in the last 48 hours.  Bilirubin:   Recent Labs   Lab 10/30/24  0716 11/06/24  1620 11/07/24  0430   BILITOT 0.5 0.9 1.0     Blood Culture:   Recent Labs   Lab 11/06/24  1620 11/06/24  1622   LABBLOO No Growth to date  No Growth to date No Growth to date  No Growth to date     BMP:   Recent Labs   Lab 11/07/24  0430   *      K 3.9      CO2 26   BUN 13   CREATININE 0.9   CALCIUM 8.9     CBC:   Recent Labs   Lab 11/06/24  1620 11/07/24  0430   WBC 13.04* 6.99   HGB 15.2 14.0   HCT 45.6 42.6    281     CMP:   Recent Labs   Lab 11/06/24  1620 11/07/24  0430    141   K 4.2 3.9    108   CO2 26 26   * 146*   BUN 14 13   CREATININE 0.9 0.9   CALCIUM 9.7 8.9   PROT 8.1 6.9   ALBUMIN 4.2 3.6   BILITOT 0.9 1.0   ALKPHOS 86 66   AST 36 33   ALT 60* 53*   ANIONGAP 10 7*     Cardiac Markers: No results for input(s): "CKMB", "MYOGLOBIN", "BNP", "TROPISTAT" in the last 48 hours.  Coagulation: No results for input(s): "PT", "INR", "APTT" in the last 48 hours.  Lactic Acid:   Recent Labs   Lab 11/06/24  1620   LACTATE 1.2     Lipase:   Recent Labs   Lab 11/06/24  1620   LIPASE 10     Lipid Panel: No results for input(s): "CHOL", "HDL", "LDLCALC", "TRIG", "CHOLHDL" in the last 48 hours.  Magnesium: No results for input(s): "MG" in the last 48 hours.  POCT Glucose:   Recent Labs   Lab 11/07/24  1159 11/07/24  1630 11/07/24  1945   POCTGLUCOSE 128* 140* 113*     Prealbumin: No results for input(s): "PREALBUMIN" in the last 48 hours.  Respiratory Culture: No results for input(s): "GSRESP", "RESPIRATORYC" in the last 48 hours.  Troponin: No results for input(s): "TROPONINI", "TROPONINIHS" in the last 48 hours.  TSH: No results for " "input(s): "TSH" in the last 4320 hours.  Urine Culture: No results for input(s): "LABURIN" in the last 48 hours.  Urine Studies:   Recent Labs   Lab 11/06/24  1746   COLORU Yellow   APPEARANCEUA Clear   PHUR 5.0   SPECGRAV 1.025   PROTEINUA Trace*   GLUCUA 2+*   KETONESU Trace*   BILIRUBINUA Negative   OCCULTUA Negative   NITRITE Negative   UROBILINOGEN Negative   LEUKOCYTESUR Negative       Significant Imaging: I have reviewed all pertinent imaging results/findings within the past 24 hours.  "

## 2024-11-08 NOTE — ASSESSMENT & PLAN NOTE
Creatine stable for now. BMP reviewed- noted Estimated Creatinine Clearance: 191.6 mL/min (based on SCr of 0.9 mg/dL). according to latest data. Based on current GFR, CKD stage is stage 2 - GFR 60-89.  Monitor UOP and serial BMP and adjust therapy as needed. Renally dose meds. Avoid nephrotoxic medications and procedures.    Continue to monitor / repeat labs pending today

## 2024-11-08 NOTE — ASSESSMENT & PLAN NOTE
Recurrent and seen on CTAP.  No perforation present   Leukocytosis, fever, elevated heart rate  Surgery consulted and recommend elective colectomy.    IV zosyn   NPO and surgery attempting clear liquid diet  IV fluids     11/8 Advancing diet to bland/low residue

## 2024-11-08 NOTE — ASSESSMENT & PLAN NOTE
This patient does have evidence of infective focus  My overall impression is sepsis.  Source: Abdominal  Antibiotics given-   Antibiotics (72h ago, onward)      Start     Stop Route Frequency Ordered    11/07/24 0515  piperacillin-tazobactam (ZOSYN) 4.5 g in D5W 100 mL IVPB (MB+)         11/09/24 0514 IV Every 8 hours (non-standard times) 11/06/24 2158          Latest lactate reviewed-  Recent Labs   Lab 11/06/24  1620   LACTATE 1.2       Organ dysfunction indicated by no organ dysfunction     Fluid challenge received 1 L bolus in the ED     Post- resuscitation assessment No - Post resuscitation assessment not needed       Will Not start Pressors- Levophed for MAP of 65  Source control achieved by: IV zosyn  Elevated HR, fever, leukocytosis     Resolved.  Discharge this afternoon.

## 2024-11-09 ENCOUNTER — NURSE TRIAGE (OUTPATIENT)
Dept: ADMINISTRATIVE | Facility: CLINIC | Age: 24
End: 2024-11-09
Payer: COMMERCIAL

## 2024-11-09 LAB — BACTERIA STL CULT: NORMAL

## 2024-11-09 RX ORDER — AMOXICILLIN AND CLAVULANATE POTASSIUM 875; 125 MG/1; MG/1
1 TABLET, FILM COATED ORAL EVERY 12 HOURS
Qty: 14 TABLET | Refills: 0 | Status: SHIPPED | OUTPATIENT
Start: 2024-11-09 | End: 2024-11-16

## 2024-11-09 NOTE — PLAN OF CARE
[facility] Emergency Department Plan of Care Note    Referral source: Nurse On-Call      Reason for consult:  Pharmacy change :  Needs prescription for Augmentin sent to new pharmacy.  Recently discharged after treatment for diverticulitis on PO Augmentin.  His pharmacy is closed      Disposition recommended: Treat in place      Additional Recommendations:  Nurse will cancel original prescription and resend to patient's requested pharmacy

## 2024-11-09 NOTE — TELEPHONE ENCOUNTER
Patient was discharged yesterday and was not able to get his prescription before the pharmacy closed. Patient requesting prescription be sent to  [facility] in [place] instead. Dispo- call pcp npw. Sent secure chat to [facility] and chart reviewed by Dr. Sterling. Ok to cancel original order and phone in Augmentin 875-125 mg tablets Take 1 by mouth every 12 hours for 7 days, 14 tablets no refills. Called [facility] at [phone] and prescription given verbally. Updated patient. Instructed to call back with additional questions or worsening of symptoms. Patient verbalized understanding.       Reason for Disposition   [1] Prescription not at pharmacy AND [2] was prescribed by PCP recently  (Exception: [facility] has access to EMR and prescription is recorded there. Go to Home Care and confirm for pharmacy.)    Protocols used: Medication Refill and Renewal Call-A-

## 2024-11-11 LAB
BACTERIA BLD CULT: NORMAL
BACTERIA BLD CULT: NORMAL

## 2024-11-11 NOTE — PLAN OF CARE
[place] - Med Surg (3rd Fl)  Discharge Final Note    Primary Care Provider: Jyoti Freire NP    Expected Discharge Date: 11/8/2024    Final Discharge Note (most recent)       Final Note - 11/11/24 1503          Final Note    Assessment Type Final Discharge Note (P)      Anticipated Discharge Disposition Home or Self Care (P)         Post-Acute Status    Discharge Delays None known at this time (P)                      Important Message from Medicare             Contact Info       Jyoti Freire NP   Specialty: Internal Medicine   Relationship: PCP - General    [address] [place] [id number]   Phone: [phone]       Next Steps: Go in 4 day(s)    Instructions: Appointment scheduled with Jyoti Freire NP 11/12/2024 at 9:40AM          Patient will discharge home today. No post acute needs. Follow up appointments scheduled above.

## 2024-11-13 LAB — O+P STL MICRO: NORMAL

## 2025-03-31 ENCOUNTER — PATIENT MESSAGE (OUTPATIENT)
Dept: ADMINISTRATIVE | Facility: HOSPITAL | Age: 25
End: 2025-03-31
Payer: COMMERCIAL

## 2025-05-12 ENCOUNTER — PATIENT MESSAGE (OUTPATIENT)
Dept: ADMINISTRATIVE | Facility: HOSPITAL | Age: 25
End: 2025-05-12
Payer: COMMERCIAL

## 2025-06-10 ENCOUNTER — PATIENT MESSAGE (OUTPATIENT)
Dept: ADMINISTRATIVE | Facility: HOSPITAL | Age: 25
End: 2025-06-10
Payer: COMMERCIAL

## 2025-08-04 ENCOUNTER — PATIENT MESSAGE (OUTPATIENT)
Dept: ADMINISTRATIVE | Facility: HOSPITAL | Age: 25
End: 2025-08-04
Payer: COMMERCIAL